# Patient Record
Sex: FEMALE | Race: OTHER | NOT HISPANIC OR LATINO
[De-identification: names, ages, dates, MRNs, and addresses within clinical notes are randomized per-mention and may not be internally consistent; named-entity substitution may affect disease eponyms.]

---

## 2023-06-30 PROBLEM — Z00.00 ENCOUNTER FOR PREVENTIVE HEALTH EXAMINATION: Status: ACTIVE | Noted: 2023-06-30

## 2023-07-13 ENCOUNTER — APPOINTMENT (OUTPATIENT)
Dept: ANTEPARTUM | Facility: CLINIC | Age: 28
End: 2023-07-13
Payer: MEDICAID

## 2023-07-13 ENCOUNTER — ASOB RESULT (OUTPATIENT)
Age: 28
End: 2023-07-13

## 2023-07-13 PROCEDURE — 76801 OB US < 14 WKS SINGLE FETUS: CPT

## 2023-07-13 PROCEDURE — 76813 OB US NUCHAL MEAS 1 GEST: CPT | Mod: 59

## 2023-08-01 ENCOUNTER — EMERGENCY (EMERGENCY)
Facility: HOSPITAL | Age: 28
LOS: 1 days | Discharge: HOME | End: 2023-08-01
Attending: EMERGENCY MEDICINE
Payer: MEDICAID

## 2023-08-01 VITALS
RESPIRATION RATE: 20 BRPM | OXYGEN SATURATION: 99 % | DIASTOLIC BLOOD PRESSURE: 73 MMHG | HEART RATE: 118 BPM | WEIGHT: 117.95 LBS | SYSTOLIC BLOOD PRESSURE: 111 MMHG | TEMPERATURE: 98 F

## 2023-08-01 LAB
ALBUMIN SERPL ELPH-MCNC: 3.3 G/DL — LOW (ref 3.5–5)
ALP SERPL-CCNC: 40 U/L — SIGNIFICANT CHANGE UP (ref 40–120)
ALT FLD-CCNC: 15 U/L DA — SIGNIFICANT CHANGE UP (ref 10–60)
ANION GAP SERPL CALC-SCNC: 7 MMOL/L — SIGNIFICANT CHANGE UP (ref 5–17)
APPEARANCE UR: CLEAR — SIGNIFICANT CHANGE UP
AST SERPL-CCNC: 12 U/L — SIGNIFICANT CHANGE UP (ref 10–40)
BASOPHILS # BLD AUTO: 0.03 K/UL — SIGNIFICANT CHANGE UP (ref 0–0.2)
BASOPHILS NFR BLD AUTO: 0.4 % — SIGNIFICANT CHANGE UP (ref 0–2)
BILIRUB SERPL-MCNC: 0.3 MG/DL — SIGNIFICANT CHANGE UP (ref 0.2–1.2)
BILIRUB UR-MCNC: NEGATIVE — SIGNIFICANT CHANGE UP
BUN SERPL-MCNC: 4 MG/DL — LOW (ref 7–18)
CALCIUM SERPL-MCNC: 9 MG/DL — SIGNIFICANT CHANGE UP (ref 8.4–10.5)
CHLORIDE SERPL-SCNC: 106 MMOL/L — SIGNIFICANT CHANGE UP (ref 96–108)
CO2 SERPL-SCNC: 23 MMOL/L — SIGNIFICANT CHANGE UP (ref 22–31)
COLOR SPEC: YELLOW — SIGNIFICANT CHANGE UP
CREAT SERPL-MCNC: 0.55 MG/DL — SIGNIFICANT CHANGE UP (ref 0.5–1.3)
DIFF PNL FLD: NEGATIVE — SIGNIFICANT CHANGE UP
EGFR: 129 ML/MIN/1.73M2 — SIGNIFICANT CHANGE UP
EOSINOPHIL # BLD AUTO: 0.1 K/UL — SIGNIFICANT CHANGE UP (ref 0–0.5)
EOSINOPHIL NFR BLD AUTO: 1.3 % — SIGNIFICANT CHANGE UP (ref 0–6)
GLUCOSE SERPL-MCNC: 85 MG/DL — SIGNIFICANT CHANGE UP (ref 70–99)
GLUCOSE UR QL: NEGATIVE — SIGNIFICANT CHANGE UP
HCG SERPL-ACNC: HIGH MIU/ML
HCT VFR BLD CALC: 36.5 % — SIGNIFICANT CHANGE UP (ref 34.5–45)
HGB BLD-MCNC: 12.2 G/DL — SIGNIFICANT CHANGE UP (ref 11.5–15.5)
IMM GRANULOCYTES NFR BLD AUTO: 0.7 % — SIGNIFICANT CHANGE UP (ref 0–0.9)
KETONES UR-MCNC: ABNORMAL
LEUKOCYTE ESTERASE UR-ACNC: NEGATIVE — SIGNIFICANT CHANGE UP
LYMPHOCYTES # BLD AUTO: 0.88 K/UL — LOW (ref 1–3.3)
LYMPHOCYTES # BLD AUTO: 11.7 % — LOW (ref 13–44)
MCHC RBC-ENTMCNC: 29 PG — SIGNIFICANT CHANGE UP (ref 27–34)
MCHC RBC-ENTMCNC: 33.4 GM/DL — SIGNIFICANT CHANGE UP (ref 32–36)
MCV RBC AUTO: 86.9 FL — SIGNIFICANT CHANGE UP (ref 80–100)
MONOCYTES # BLD AUTO: 0.6 K/UL — SIGNIFICANT CHANGE UP (ref 0–0.9)
MONOCYTES NFR BLD AUTO: 8 % — SIGNIFICANT CHANGE UP (ref 2–14)
NEUTROPHILS # BLD AUTO: 5.87 K/UL — SIGNIFICANT CHANGE UP (ref 1.8–7.4)
NEUTROPHILS NFR BLD AUTO: 77.9 % — HIGH (ref 43–77)
NITRITE UR-MCNC: NEGATIVE — SIGNIFICANT CHANGE UP
NRBC # BLD: 0 /100 WBCS — SIGNIFICANT CHANGE UP (ref 0–0)
PH UR: 7 — SIGNIFICANT CHANGE UP (ref 5–8)
PLATELET # BLD AUTO: 171 K/UL — SIGNIFICANT CHANGE UP (ref 150–400)
POTASSIUM SERPL-MCNC: 4.3 MMOL/L — SIGNIFICANT CHANGE UP (ref 3.5–5.3)
POTASSIUM SERPL-SCNC: 4.3 MMOL/L — SIGNIFICANT CHANGE UP (ref 3.5–5.3)
PROT SERPL-MCNC: 7.6 G/DL — SIGNIFICANT CHANGE UP (ref 6–8.3)
PROT UR-MCNC: NEGATIVE — SIGNIFICANT CHANGE UP
RBC # BLD: 4.2 M/UL — SIGNIFICANT CHANGE UP (ref 3.8–5.2)
RBC # FLD: 13.8 % — SIGNIFICANT CHANGE UP (ref 10.3–14.5)
SODIUM SERPL-SCNC: 136 MMOL/L — SIGNIFICANT CHANGE UP (ref 135–145)
SP GR SPEC: 1 — LOW (ref 1.01–1.02)
UROBILINOGEN FLD QL: NEGATIVE — SIGNIFICANT CHANGE UP
WBC # BLD: 7.53 K/UL — SIGNIFICANT CHANGE UP (ref 3.8–10.5)
WBC # FLD AUTO: 7.53 K/UL — SIGNIFICANT CHANGE UP (ref 3.8–10.5)

## 2023-08-01 PROCEDURE — 96374 THER/PROPH/DIAG INJ IV PUSH: CPT

## 2023-08-01 PROCEDURE — 85025 COMPLETE CBC W/AUTO DIFF WBC: CPT

## 2023-08-01 PROCEDURE — 99285 EMERGENCY DEPT VISIT HI MDM: CPT | Mod: 25

## 2023-08-01 PROCEDURE — 84702 CHORIONIC GONADOTROPIN TEST: CPT

## 2023-08-01 PROCEDURE — 87086 URINE CULTURE/COLONY COUNT: CPT

## 2023-08-01 PROCEDURE — 36415 COLL VENOUS BLD VENIPUNCTURE: CPT

## 2023-08-01 PROCEDURE — 76805 OB US >/= 14 WKS SNGL FETUS: CPT | Mod: 26

## 2023-08-01 PROCEDURE — 93005 ELECTROCARDIOGRAM TRACING: CPT

## 2023-08-01 PROCEDURE — 76805 OB US >/= 14 WKS SNGL FETUS: CPT

## 2023-08-01 PROCEDURE — 81003 URINALYSIS AUTO W/O SCOPE: CPT

## 2023-08-01 PROCEDURE — 80053 COMPREHEN METABOLIC PANEL: CPT

## 2023-08-01 PROCEDURE — 99284 EMERGENCY DEPT VISIT MOD MDM: CPT

## 2023-08-01 RX ORDER — SODIUM CHLORIDE 9 MG/ML
1000 INJECTION INTRAMUSCULAR; INTRAVENOUS; SUBCUTANEOUS ONCE
Refills: 0 | Status: COMPLETED | OUTPATIENT
Start: 2023-08-01 | End: 2023-08-01

## 2023-08-01 RX ORDER — ACETAMINOPHEN 500 MG
1000 TABLET ORAL ONCE
Refills: 0 | Status: COMPLETED | OUTPATIENT
Start: 2023-08-01 | End: 2023-08-01

## 2023-08-01 RX ADMIN — SODIUM CHLORIDE 1000 MILLILITER(S): 9 INJECTION INTRAMUSCULAR; INTRAVENOUS; SUBCUTANEOUS at 18:52

## 2023-08-01 RX ADMIN — Medication 400 MILLIGRAM(S): at 18:52

## 2023-08-01 NOTE — ED ADULT TRIAGE NOTE - LANGUAGE ASSISTANCE NEEDED
Size Of Lesion In Cm (Optional): 0 Detail Level: Simple Introduction Text (Please End With A Colon): The following procedure was deferred: Yes-Patient/Caregiver accepts free interpretation services...

## 2023-08-01 NOTE — ED PROVIDER NOTE - PATIENT PORTAL LINK FT
You can access the FollowMyHealth Patient Portal offered by [facility] by registering at the following website: [email or web address]. By joining [facility]’s FollowMyHealth portal, you will also be able to view your health information using other applications (apps) compatible with our system.

## 2023-08-01 NOTE — ED ADULT NURSE NOTE - NSFALLUNIVINTERV_ED_ALL_ED
Bed/Stretcher in lowest position, wheels locked, appropriate side rails in place/Call bell, personal items and telephone in reach/Instruct patient to call for assistance before getting out of bed/chair/stretcher/Non-slip footwear applied when patient is off stretcher/[place] to call system/Physically safe environment - no spills, clutter or unnecessary equipment/Purposeful proactive rounding/Room/bathroom lighting operational, light cord in reach

## 2023-08-01 NOTE — ED ADULT NURSE NOTE - OBJECTIVE STATEMENT
16 weeks pregnant with low back pain radiating to the front of abdomen denies vaginal bleeding no difficulty of breathing denies nausea no vomitng

## 2023-08-01 NOTE — ED PROVIDER NOTE - OBJECTIVE STATEMENT
27 year old female denies PMH currently 16 weeks pregnant coming in with lower abd pain and back pain. denies all other complaints.

## 2023-08-02 LAB
CULTURE RESULTS: SIGNIFICANT CHANGE UP
SPECIMEN SOURCE: SIGNIFICANT CHANGE UP

## 2023-08-03 ENCOUNTER — NON-APPOINTMENT (OUTPATIENT)
Age: 28
End: 2023-08-03

## 2023-08-03 ENCOUNTER — APPOINTMENT (OUTPATIENT)
Dept: OBGYN | Facility: CLINIC | Age: 28
End: 2023-08-03
Payer: MEDICAID

## 2023-08-03 ENCOUNTER — OUTPATIENT (OUTPATIENT)
Dept: OUTPATIENT SERVICES | Facility: HOSPITAL | Age: 28
LOS: 1 days | End: 2023-08-03
Payer: MEDICAID

## 2023-08-03 VITALS
OXYGEN SATURATION: 97 % | DIASTOLIC BLOOD PRESSURE: 69 MMHG | RESPIRATION RATE: 18 BRPM | WEIGHT: 118 LBS | BODY MASS INDEX: 21.71 KG/M2 | SYSTOLIC BLOOD PRESSURE: 113 MMHG | TEMPERATURE: 98.3 F | HEART RATE: 108 BPM | HEIGHT: 62 IN

## 2023-08-03 DIAGNOSIS — Z3A.16 16 WEEKS GESTATION OF PREGNANCY: ICD-10-CM

## 2023-08-03 DIAGNOSIS — Z34.00 ENCOUNTER FOR SUPERVISION OF NORMAL FIRST PREGNANCY, UNSPECIFIED TRIMESTER: ICD-10-CM

## 2023-08-03 PROCEDURE — 81420 FETAL CHRMOML ANEUPLOIDY: CPT

## 2023-08-03 PROCEDURE — 99203 OFFICE O/P NEW LOW 30 MIN: CPT

## 2023-08-03 PROCEDURE — 87086 URINE CULTURE/COLONY COUNT: CPT

## 2023-08-03 PROCEDURE — 87591 N.GONORRHOEAE DNA AMP PROB: CPT

## 2023-08-03 PROCEDURE — 81025 URINE PREGNANCY TEST: CPT

## 2023-08-03 PROCEDURE — 36415 COLL VENOUS BLD VENIPUNCTURE: CPT

## 2023-08-03 PROCEDURE — 82105 ALPHA-FETOPROTEIN SERUM: CPT

## 2023-08-03 PROCEDURE — 81003 URINALYSIS AUTO W/O SCOPE: CPT

## 2023-08-03 PROCEDURE — 87491 CHLMYD TRACH DNA AMP PROBE: CPT

## 2023-08-03 PROCEDURE — G0463: CPT

## 2023-08-08 DIAGNOSIS — Z3A.16 16 WEEKS GESTATION OF PREGNANCY: ICD-10-CM

## 2023-08-08 DIAGNOSIS — Z34.92 ENCOUNTER FOR SUPERVISION OF NORMAL PREGNANCY, UNSPECIFIED, SECOND TRIMESTER: ICD-10-CM

## 2023-08-09 PROBLEM — Z00.00 ENCOUNTER FOR PREVENTIVE HEALTH EXAMINATION: Status: ACTIVE | Noted: 2023-08-09

## 2023-08-10 LAB
BACTERIA UR CULT: NORMAL
C TRACH RRNA SPEC QL NAA+PROBE: NOT DETECTED
CHROMOSOME13 INTERPRETATION: NORMAL
CHROMOSOME13 TEST RESULT: NORMAL
CHROMOSOME18 INTERPRETATION: NORMAL
CHROMOSOME18 TEST RESULT: NORMAL
CHROMOSOME21 INTERPRETATION: NORMAL
CHROMOSOME21 TEST RESULT: NORMAL
CYTOLOGY CVX/VAG DOC THIN PREP: NORMAL
FETAL FRACTION: NORMAL
N GONORRHOEA RRNA SPEC QL NAA+PROBE: NOT DETECTED
PERFORMANCE AND LIMITATIONS: NORMAL
SEX CHROMOSOME INTERPRETATION: NORMAL
SEX CHROMOSOME TEST RESULT: NORMAL
SOURCE TP AMPLIFICATION: NORMAL
VERIFI PRENATAL TEST: NOT DETECTED

## 2023-08-14 ENCOUNTER — APPOINTMENT (OUTPATIENT)
Dept: OBGYN | Facility: CLINIC | Age: 28
End: 2023-08-14

## 2023-08-14 ENCOUNTER — NON-APPOINTMENT (OUTPATIENT)
Age: 28
End: 2023-08-14

## 2023-08-18 ENCOUNTER — APPOINTMENT (OUTPATIENT)
Dept: OBGYN | Facility: CLINIC | Age: 28
End: 2023-08-18
Payer: MEDICAID

## 2023-08-18 ENCOUNTER — OUTPATIENT (OUTPATIENT)
Dept: OUTPATIENT SERVICES | Facility: HOSPITAL | Age: 28
LOS: 1 days | End: 2023-08-18
Payer: MEDICAID

## 2023-08-18 ENCOUNTER — APPOINTMENT (OUTPATIENT)
Dept: ANTEPARTUM | Facility: CLINIC | Age: 28
End: 2023-08-18
Payer: MEDICAID

## 2023-08-18 ENCOUNTER — ASOB RESULT (OUTPATIENT)
Age: 28
End: 2023-08-18

## 2023-08-18 VITALS
RESPIRATION RATE: 18 BRPM | DIASTOLIC BLOOD PRESSURE: 72 MMHG | HEART RATE: 82 BPM | SYSTOLIC BLOOD PRESSURE: 106 MMHG | OXYGEN SATURATION: 97 % | TEMPERATURE: 98.8 F | HEIGHT: 62 IN

## 2023-08-18 DIAGNOSIS — Z34.00 ENCOUNTER FOR SUPERVISION OF NORMAL FIRST PREGNANCY, UNSPECIFIED TRIMESTER: ICD-10-CM

## 2023-08-18 LAB
AFP MOM: 1.33
AFP VALUE: 54.1 NG/ML
ALPHA FETOPROTEIN SERUM COMMENT: NORMAL
ALPHA FETOPROTEIN SERUM INTERPRETATION: NORMAL
ALPHA FETOPROTEIN SERUM RESULTS: NORMAL
ALPHA FETOPROTEIN SERUM TEST RESULTS: NORMAL
GESTATIONAL AGE BASED ON: NORMAL
GESTATIONAL AGE ON COLLECTION DATE: 16 WEEKS
INSULIN DEP DIABETES: NO
MATERNAL AGE AT EDD AFP: 28.4 YR
MULTIPLE GESTATION: NO
OSBR RISK 1 IN: 4399
RACE: NORMAL
WEIGHT AFP: 118 LBS

## 2023-08-18 PROCEDURE — 99213 OFFICE O/P EST LOW 20 MIN: CPT

## 2023-08-18 PROCEDURE — 76811 OB US DETAILED SNGL FETUS: CPT

## 2023-08-18 PROCEDURE — 81003 URINALYSIS AUTO W/O SCOPE: CPT

## 2023-08-18 PROCEDURE — G0463: CPT

## 2023-08-21 DIAGNOSIS — Z34.92 ENCOUNTER FOR SUPERVISION OF NORMAL PREGNANCY, UNSPECIFIED, SECOND TRIMESTER: ICD-10-CM

## 2023-08-21 DIAGNOSIS — Z3A.16 16 WEEKS GESTATION OF PREGNANCY: ICD-10-CM

## 2023-08-22 ENCOUNTER — NON-APPOINTMENT (OUTPATIENT)
Age: 28
End: 2023-08-22

## 2023-08-31 ENCOUNTER — APPOINTMENT (OUTPATIENT)
Dept: ANTEPARTUM | Facility: CLINIC | Age: 28
End: 2023-08-31

## 2023-09-15 ENCOUNTER — APPOINTMENT (OUTPATIENT)
Dept: OBGYN | Facility: CLINIC | Age: 28
End: 2023-09-15
Payer: MEDICAID

## 2023-09-15 ENCOUNTER — NON-APPOINTMENT (OUTPATIENT)
Age: 28
End: 2023-09-15

## 2023-09-15 ENCOUNTER — OUTPATIENT (OUTPATIENT)
Dept: OUTPATIENT SERVICES | Facility: HOSPITAL | Age: 28
LOS: 1 days | End: 2023-09-15
Payer: MEDICAID

## 2023-09-15 ENCOUNTER — ASOB RESULT (OUTPATIENT)
Age: 28
End: 2023-09-15

## 2023-09-15 ENCOUNTER — APPOINTMENT (OUTPATIENT)
Dept: ANTEPARTUM | Facility: CLINIC | Age: 28
End: 2023-09-15
Payer: MEDICAID

## 2023-09-15 VITALS
HEART RATE: 105 BPM | DIASTOLIC BLOOD PRESSURE: 65 MMHG | OXYGEN SATURATION: 99 % | SYSTOLIC BLOOD PRESSURE: 102 MMHG | TEMPERATURE: 97.9 F | HEIGHT: 62 IN | WEIGHT: 128 LBS | BODY MASS INDEX: 23.55 KG/M2

## 2023-09-15 DIAGNOSIS — Z3A.16 16 WEEKS GESTATION OF PREGNANCY: ICD-10-CM

## 2023-09-15 DIAGNOSIS — Z34.00 ENCOUNTER FOR SUPERVISION OF NORMAL FIRST PREGNANCY, UNSPECIFIED TRIMESTER: ICD-10-CM

## 2023-09-15 PROCEDURE — 76816 OB US FOLLOW-UP PER FETUS: CPT

## 2023-09-15 PROCEDURE — G0463: CPT

## 2023-09-15 PROCEDURE — 99213 OFFICE O/P EST LOW 20 MIN: CPT

## 2023-09-15 PROCEDURE — 81003 URINALYSIS AUTO W/O SCOPE: CPT

## 2023-09-18 DIAGNOSIS — O34.219 MATERNAL CARE FOR UNSPECIFIED TYPE SCAR FROM PREVIOUS CESAREAN DELIVERY: ICD-10-CM

## 2023-09-18 DIAGNOSIS — Z3A.22 22 WEEKS GESTATION OF PREGNANCY: ICD-10-CM

## 2023-09-18 DIAGNOSIS — Z78.9 OTHER SPECIFIED HEALTH STATUS: ICD-10-CM

## 2023-10-27 ENCOUNTER — ASOB RESULT (OUTPATIENT)
Age: 28
End: 2023-10-27

## 2023-10-27 ENCOUNTER — APPOINTMENT (OUTPATIENT)
Dept: ANTEPARTUM | Facility: CLINIC | Age: 28
End: 2023-10-27
Payer: MEDICAID

## 2023-10-27 ENCOUNTER — APPOINTMENT (OUTPATIENT)
Dept: OBGYN | Facility: CLINIC | Age: 28
End: 2023-10-27
Payer: MEDICAID

## 2023-10-27 ENCOUNTER — OUTPATIENT (OUTPATIENT)
Dept: OUTPATIENT SERVICES | Facility: HOSPITAL | Age: 28
LOS: 1 days | End: 2023-10-27
Payer: MEDICAID

## 2023-10-27 VITALS
BODY MASS INDEX: 24.29 KG/M2 | SYSTOLIC BLOOD PRESSURE: 93 MMHG | DIASTOLIC BLOOD PRESSURE: 60 MMHG | HEIGHT: 62 IN | TEMPERATURE: 97.2 F | WEIGHT: 132 LBS | HEART RATE: 99 BPM | OXYGEN SATURATION: 100 % | RESPIRATION RATE: 18 BRPM

## 2023-10-27 DIAGNOSIS — Z34.00 ENCOUNTER FOR SUPERVISION OF NORMAL FIRST PREGNANCY, UNSPECIFIED TRIMESTER: ICD-10-CM

## 2023-10-27 PROCEDURE — G0463: CPT

## 2023-10-27 PROCEDURE — 99213 OFFICE O/P EST LOW 20 MIN: CPT

## 2023-10-27 PROCEDURE — 86780 TREPONEMA PALLIDUM: CPT

## 2023-10-27 PROCEDURE — 82950 GLUCOSE TEST: CPT

## 2023-10-27 PROCEDURE — 76816 OB US FOLLOW-UP PER FETUS: CPT

## 2023-10-27 PROCEDURE — 81003 URINALYSIS AUTO W/O SCOPE: CPT

## 2023-10-27 PROCEDURE — 85025 COMPLETE CBC W/AUTO DIFF WBC: CPT

## 2023-10-30 ENCOUNTER — NON-APPOINTMENT (OUTPATIENT)
Age: 28
End: 2023-10-30

## 2023-10-30 DIAGNOSIS — Z34.90 ENCOUNTER FOR SUPERVISION OF NORMAL PREGNANCY, UNSPECIFIED, UNSPECIFIED TRIMESTER: ICD-10-CM

## 2023-10-30 DIAGNOSIS — Z34.93 ENCOUNTER FOR SUPERVISION OF NORMAL PREGNANCY, UNSPECIFIED, THIRD TRIMESTER: ICD-10-CM

## 2023-10-30 LAB
BASOPHILS # BLD AUTO: 0.03 K/UL
BASOPHILS NFR BLD AUTO: 0.3 %
EOSINOPHIL # BLD AUTO: 0.27 K/UL
EOSINOPHIL NFR BLD AUTO: 2.4 %
GLUCOSE 1H P 50 G GLC PO SERPL-MCNC: 87 MG/DL
HCT VFR BLD CALC: 32.3 %
HGB BLD-MCNC: 10.1 G/DL
IMM GRANULOCYTES NFR BLD AUTO: 2.2 %
LYMPHOCYTES # BLD AUTO: 1.5 K/UL
LYMPHOCYTES NFR BLD AUTO: 13.2 %
MAN DIFF?: NORMAL
MCHC RBC-ENTMCNC: 27.2 PG
MCHC RBC-ENTMCNC: 31.3 GM/DL
MCV RBC AUTO: 86.8 FL
MONOCYTES # BLD AUTO: 0.59 K/UL
MONOCYTES NFR BLD AUTO: 5.2 %
NEUTROPHILS # BLD AUTO: 8.7 K/UL
NEUTROPHILS NFR BLD AUTO: 76.7 %
PLATELET # BLD AUTO: 177 K/UL
RBC # BLD: 3.72 M/UL
RBC # FLD: 13.5 %
T PALLIDUM AB SER QL IA: NEGATIVE
WBC # FLD AUTO: 11.34 K/UL

## 2023-10-30 RX ORDER — CHLORHEXIDINE GLUCONATE 4 %
325 (65 FE) LIQUID (ML) TOPICAL
Qty: 90 | Refills: 2 | Status: ACTIVE | COMMUNITY
Start: 2023-10-30 | End: 1900-01-01

## 2023-11-10 ENCOUNTER — APPOINTMENT (OUTPATIENT)
Dept: OBGYN | Facility: CLINIC | Age: 28
End: 2023-11-10
Payer: MEDICAID

## 2023-11-10 ENCOUNTER — OUTPATIENT (OUTPATIENT)
Dept: OUTPATIENT SERVICES | Facility: HOSPITAL | Age: 28
LOS: 1 days | End: 2023-11-10
Payer: MEDICAID

## 2023-11-10 VITALS
HEART RATE: 108 BPM | OXYGEN SATURATION: 100 % | WEIGHT: 136 LBS | BODY MASS INDEX: 25.03 KG/M2 | TEMPERATURE: 98.9 F | HEIGHT: 62 IN | SYSTOLIC BLOOD PRESSURE: 97 MMHG | DIASTOLIC BLOOD PRESSURE: 66 MMHG | RESPIRATION RATE: 18 BRPM

## 2023-11-10 DIAGNOSIS — Z34.00 ENCOUNTER FOR SUPERVISION OF NORMAL FIRST PREGNANCY, UNSPECIFIED TRIMESTER: ICD-10-CM

## 2023-11-10 PROCEDURE — 99213 OFFICE O/P EST LOW 20 MIN: CPT

## 2023-11-10 PROCEDURE — G0463: CPT

## 2023-11-10 PROCEDURE — 81003 URINALYSIS AUTO W/O SCOPE: CPT

## 2023-11-13 DIAGNOSIS — Z78.9 OTHER SPECIFIED HEALTH STATUS: ICD-10-CM

## 2023-11-13 DIAGNOSIS — O34.219 MATERNAL CARE FOR UNSPECIFIED TYPE SCAR FROM PREVIOUS CESAREAN DELIVERY: ICD-10-CM

## 2023-11-13 DIAGNOSIS — Z34.90 ENCOUNTER FOR SUPERVISION OF NORMAL PREGNANCY, UNSPECIFIED, UNSPECIFIED TRIMESTER: ICD-10-CM

## 2023-11-27 ENCOUNTER — APPOINTMENT (OUTPATIENT)
Dept: OBGYN | Facility: CLINIC | Age: 28
End: 2023-11-27
Payer: MEDICAID

## 2023-11-27 ENCOUNTER — OUTPATIENT (OUTPATIENT)
Dept: OUTPATIENT SERVICES | Facility: HOSPITAL | Age: 28
LOS: 1 days | End: 2023-11-27
Payer: MEDICAID

## 2023-11-27 VITALS
WEIGHT: 136 LBS | RESPIRATION RATE: 18 BRPM | TEMPERATURE: 97.2 F | HEART RATE: 108 BPM | DIASTOLIC BLOOD PRESSURE: 64 MMHG | OXYGEN SATURATION: 98 % | BODY MASS INDEX: 25.03 KG/M2 | HEIGHT: 62 IN | SYSTOLIC BLOOD PRESSURE: 99 MMHG

## 2023-11-27 DIAGNOSIS — Z34.00 ENCOUNTER FOR SUPERVISION OF NORMAL FIRST PREGNANCY, UNSPECIFIED TRIMESTER: ICD-10-CM

## 2023-11-27 PROCEDURE — 36415 COLL VENOUS BLD VENIPUNCTURE: CPT | Mod: NC

## 2023-11-27 PROCEDURE — 36415 COLL VENOUS BLD VENIPUNCTURE: CPT

## 2023-11-27 PROCEDURE — 81003 URINALYSIS AUTO W/O SCOPE: CPT

## 2023-11-27 PROCEDURE — 86901 BLOOD TYPING SEROLOGIC RH(D): CPT

## 2023-11-27 PROCEDURE — 86480 TB TEST CELL IMMUN MEASURE: CPT

## 2023-11-27 PROCEDURE — G0463: CPT

## 2023-11-27 PROCEDURE — 80053 COMPREHEN METABOLIC PANEL: CPT

## 2023-11-27 PROCEDURE — 83655 ASSAY OF LEAD: CPT

## 2023-11-27 PROCEDURE — 86765 RUBEOLA ANTIBODY: CPT

## 2023-11-27 PROCEDURE — 86900 BLOOD TYPING SEROLOGIC ABO: CPT

## 2023-11-27 PROCEDURE — 86803 HEPATITIS C AB TEST: CPT

## 2023-11-27 PROCEDURE — 84443 ASSAY THYROID STIM HORMONE: CPT

## 2023-11-27 PROCEDURE — 85025 COMPLETE CBC W/AUTO DIFF WBC: CPT

## 2023-11-27 PROCEDURE — 86762 RUBELLA ANTIBODY: CPT

## 2023-11-27 PROCEDURE — 99213 OFFICE O/P EST LOW 20 MIN: CPT

## 2023-11-30 DIAGNOSIS — Z78.9 OTHER SPECIFIED HEALTH STATUS: ICD-10-CM

## 2023-11-30 DIAGNOSIS — Z3A.32 32 WEEKS GESTATION OF PREGNANCY: ICD-10-CM

## 2023-11-30 DIAGNOSIS — O34.219 MATERNAL CARE FOR UNSPECIFIED TYPE SCAR FROM PREVIOUS CESAREAN DELIVERY: ICD-10-CM

## 2023-12-01 ENCOUNTER — NON-APPOINTMENT (OUTPATIENT)
Age: 28
End: 2023-12-01

## 2023-12-01 LAB
ABO + RH PNL BLD: NORMAL
ALBUMIN SERPL ELPH-MCNC: 3.4 G/DL
ALP BLD-CCNC: 90 U/L
ALT SERPL-CCNC: 8 U/L
ANION GAP SERPL CALC-SCNC: 12 MMOL/L
AST SERPL-CCNC: 11 U/L
BASOPHILS # BLD AUTO: 0.04 K/UL
BASOPHILS NFR BLD AUTO: 0.3 %
BILIRUB SERPL-MCNC: <0.2 MG/DL
BUN SERPL-MCNC: 7 MG/DL
CALCIUM SERPL-MCNC: 9 MG/DL
CHLORIDE SERPL-SCNC: 105 MMOL/L
CO2 SERPL-SCNC: 20 MMOL/L
CREAT SERPL-MCNC: 0.53 MG/DL
EGFR: 129 ML/MIN/1.73M2
EOSINOPHIL # BLD AUTO: 0.32 K/UL
EOSINOPHIL NFR BLD AUTO: 2.8 %
GLUCOSE SERPL-MCNC: 87 MG/DL
HCT VFR BLD CALC: 31.7 %
HCV AB SER QL: NONREACTIVE
HCV S/CO RATIO: 0.08 S/CO
HGB BLD-MCNC: 9.8 G/DL
IMM GRANULOCYTES NFR BLD AUTO: 2.5 %
LEAD BLD-MCNC: 1.2 UG/DL
LYMPHOCYTES # BLD AUTO: 1.51 K/UL
LYMPHOCYTES NFR BLD AUTO: 13.1 %
M TB IFN-G BLD-IMP: NEGATIVE
MAN DIFF?: NORMAL
MCHC RBC-ENTMCNC: 26.1 PG
MCHC RBC-ENTMCNC: 30.9 GM/DL
MCV RBC AUTO: 84.5 FL
MEV IGG FLD QL IA: 22.1 AU/ML
MEV IGG+IGM SER-IMP: POSITIVE
MONOCYTES # BLD AUTO: 0.54 K/UL
MONOCYTES NFR BLD AUTO: 4.7 %
NEUTROPHILS # BLD AUTO: 8.83 K/UL
NEUTROPHILS NFR BLD AUTO: 76.6 %
PLATELET # BLD AUTO: 190 K/UL
POTASSIUM SERPL-SCNC: 3.8 MMOL/L
PROT SERPL-MCNC: 6 G/DL
QUANTIFERON TB PLUS MITOGEN MINUS NIL: 0.74 IU/ML
QUANTIFERON TB PLUS NIL: 0.03 IU/ML
QUANTIFERON TB PLUS TB1 MINUS NIL: 0.03 IU/ML
QUANTIFERON TB PLUS TB2 MINUS NIL: 0.02 IU/ML
RBC # BLD: 3.75 M/UL
RBC # FLD: 15.7 %
RUBV IGG FLD-ACNC: 11.9 INDEX
RUBV IGG SER-IMP: POSITIVE
SODIUM SERPL-SCNC: 138 MMOL/L
TSH SERPL-ACNC: 0.74 UIU/ML
WBC # FLD AUTO: 11.53 K/UL

## 2023-12-03 ENCOUNTER — NON-APPOINTMENT (OUTPATIENT)
Age: 28
End: 2023-12-03

## 2023-12-07 ENCOUNTER — NON-APPOINTMENT (OUTPATIENT)
Age: 28
End: 2023-12-07

## 2023-12-08 ENCOUNTER — APPOINTMENT (OUTPATIENT)
Dept: ANTEPARTUM | Facility: CLINIC | Age: 28
End: 2023-12-08
Payer: MEDICAID

## 2023-12-08 ENCOUNTER — APPOINTMENT (OUTPATIENT)
Dept: OBGYN | Facility: CLINIC | Age: 28
End: 2023-12-08
Payer: MEDICAID

## 2023-12-08 ENCOUNTER — ASOB RESULT (OUTPATIENT)
Age: 28
End: 2023-12-08

## 2023-12-08 ENCOUNTER — OUTPATIENT (OUTPATIENT)
Dept: OUTPATIENT SERVICES | Facility: HOSPITAL | Age: 28
LOS: 1 days | End: 2023-12-08
Payer: MEDICAID

## 2023-12-08 VITALS
BODY MASS INDEX: 25.21 KG/M2 | TEMPERATURE: 97 F | SYSTOLIC BLOOD PRESSURE: 99 MMHG | HEIGHT: 62 IN | RESPIRATION RATE: 18 BRPM | WEIGHT: 137 LBS | OXYGEN SATURATION: 98 % | HEART RATE: 105 BPM | DIASTOLIC BLOOD PRESSURE: 62 MMHG

## 2023-12-08 DIAGNOSIS — Z34.00 ENCOUNTER FOR SUPERVISION OF NORMAL FIRST PREGNANCY, UNSPECIFIED TRIMESTER: ICD-10-CM

## 2023-12-08 PROCEDURE — 76816 OB US FOLLOW-UP PER FETUS: CPT

## 2023-12-08 PROCEDURE — 81003 URINALYSIS AUTO W/O SCOPE: CPT

## 2023-12-08 PROCEDURE — 99213 OFFICE O/P EST LOW 20 MIN: CPT

## 2023-12-08 PROCEDURE — 36415 COLL VENOUS BLD VENIPUNCTURE: CPT

## 2023-12-08 PROCEDURE — 86900 BLOOD TYPING SEROLOGIC ABO: CPT

## 2023-12-08 PROCEDURE — 76819 FETAL BIOPHYS PROFIL W/O NST: CPT | Mod: 59

## 2023-12-08 PROCEDURE — 86780 TREPONEMA PALLIDUM: CPT

## 2023-12-08 PROCEDURE — 85025 COMPLETE CBC W/AUTO DIFF WBC: CPT

## 2023-12-08 PROCEDURE — 87389 HIV-1 AG W/HIV-1&-2 AB AG IA: CPT

## 2023-12-08 PROCEDURE — 86850 RBC ANTIBODY SCREEN: CPT

## 2023-12-08 PROCEDURE — G0463: CPT

## 2023-12-11 DIAGNOSIS — R06.00 DYSPNEA, UNSPECIFIED: ICD-10-CM

## 2023-12-11 DIAGNOSIS — Z3A.34 34 WEEKS GESTATION OF PREGNANCY: ICD-10-CM

## 2023-12-11 DIAGNOSIS — O34.219 MATERNAL CARE FOR UNSPECIFIED TYPE SCAR FROM PREVIOUS CESAREAN DELIVERY: ICD-10-CM

## 2023-12-11 DIAGNOSIS — Z78.9 OTHER SPECIFIED HEALTH STATUS: ICD-10-CM

## 2023-12-11 LAB
BASOPHILS # BLD AUTO: 0.02 K/UL
BASOPHILS NFR BLD AUTO: 0.2 %
EOSINOPHIL # BLD AUTO: 0.2 K/UL
EOSINOPHIL NFR BLD AUTO: 1.9 %
HCT VFR BLD CALC: 30.2 %
HGB BLD-MCNC: 9.6 G/DL
HIV1+2 AB SPEC QL IA.RAPID: NONREACTIVE
IMM GRANULOCYTES NFR BLD AUTO: 2 %
LYMPHOCYTES # BLD AUTO: 1.29 K/UL
LYMPHOCYTES NFR BLD AUTO: 12.4 %
MAN DIFF?: NORMAL
MCHC RBC-ENTMCNC: 25.9 PG
MCHC RBC-ENTMCNC: 31.8 GM/DL
MCV RBC AUTO: 81.6 FL
MONOCYTES # BLD AUTO: 0.52 K/UL
MONOCYTES NFR BLD AUTO: 5 %
NEUTROPHILS # BLD AUTO: 8.15 K/UL
NEUTROPHILS NFR BLD AUTO: 78.5 %
PLATELET # BLD AUTO: 209 K/UL
RBC # BLD: 3.7 M/UL
RBC # FLD: 15.6 %
T PALLIDUM AB SER QL IA: NEGATIVE
WBC # FLD AUTO: 10.39 K/UL

## 2023-12-21 ENCOUNTER — NON-APPOINTMENT (OUTPATIENT)
Age: 28
End: 2023-12-21

## 2023-12-22 ENCOUNTER — OUTPATIENT (OUTPATIENT)
Dept: OUTPATIENT SERVICES | Facility: HOSPITAL | Age: 28
LOS: 1 days | End: 2023-12-22
Payer: MEDICAID

## 2023-12-22 ENCOUNTER — APPOINTMENT (OUTPATIENT)
Dept: OBGYN | Facility: CLINIC | Age: 28
End: 2023-12-22
Payer: MEDICAID

## 2023-12-22 ENCOUNTER — LABORATORY RESULT (OUTPATIENT)
Age: 28
End: 2023-12-22

## 2023-12-22 VITALS
TEMPERATURE: 97.2 F | BODY MASS INDEX: 25.58 KG/M2 | SYSTOLIC BLOOD PRESSURE: 103 MMHG | HEART RATE: 95 BPM | HEIGHT: 62 IN | WEIGHT: 139 LBS | OXYGEN SATURATION: 99 % | RESPIRATION RATE: 18 BRPM | DIASTOLIC BLOOD PRESSURE: 70 MMHG

## 2023-12-22 DIAGNOSIS — Z34.00 ENCOUNTER FOR SUPERVISION OF NORMAL FIRST PREGNANCY, UNSPECIFIED TRIMESTER: ICD-10-CM

## 2023-12-22 PROCEDURE — 36415 COLL VENOUS BLD VENIPUNCTURE: CPT

## 2023-12-22 PROCEDURE — 86850 RBC ANTIBODY SCREEN: CPT

## 2023-12-22 PROCEDURE — 87653 STREP B DNA AMP PROBE: CPT

## 2023-12-22 PROCEDURE — 99213 OFFICE O/P EST LOW 20 MIN: CPT

## 2023-12-22 PROCEDURE — 81003 URINALYSIS AUTO W/O SCOPE: CPT

## 2023-12-22 PROCEDURE — 87389 HIV-1 AG W/HIV-1&-2 AB AG IA: CPT

## 2023-12-22 PROCEDURE — G0463: CPT

## 2023-12-22 PROCEDURE — 86900 BLOOD TYPING SEROLOGIC ABO: CPT

## 2023-12-22 PROCEDURE — 86901 BLOOD TYPING SEROLOGIC RH(D): CPT

## 2023-12-22 PROCEDURE — 85025 COMPLETE CBC W/AUTO DIFF WBC: CPT

## 2023-12-22 PROCEDURE — 86780 TREPONEMA PALLIDUM: CPT

## 2023-12-26 LAB
ABO + RH PNL BLD: NORMAL
BASOPHILS # BLD AUTO: 0 K/UL
BASOPHILS NFR BLD AUTO: 0 %
BLD GP AB SCN SERPL QL: NORMAL
C TRACH RRNA SPEC QL NAA+PROBE: NOT DETECTED
EOSINOPHIL # BLD AUTO: 0.49 K/UL
EOSINOPHIL NFR BLD AUTO: 5.1 %
GP B STREP DNA SPEC QL NAA+PROBE: NOT DETECTED
HCT VFR BLD CALC: 34.2 %
HGB BLD-MCNC: 10.6 G/DL
HIV1+2 AB SPEC QL IA.RAPID: NONREACTIVE
LYMPHOCYTES # BLD AUTO: 1.22 K/UL
LYMPHOCYTES NFR BLD AUTO: 12.7 %
MAN DIFF?: NORMAL
MCHC RBC-ENTMCNC: 26.2 PG
MCHC RBC-ENTMCNC: 31 GM/DL
MCV RBC AUTO: 84.7 FL
MONOCYTES # BLD AUTO: 0 K/UL
MONOCYTES NFR BLD AUTO: 0 %
N GONORRHOEA RRNA SPEC QL NAA+PROBE: NOT DETECTED
NEUTROPHILS # BLD AUTO: 7.81 K/UL
NEUTROPHILS NFR BLD AUTO: 81.4 %
PLATELET # BLD AUTO: 166 K/UL
RBC # BLD: 4.04 M/UL
RBC # FLD: 20.4 %
SOURCE AMPLIFICATION: NORMAL
SOURCE GBS: NORMAL
T PALLIDUM AB SER QL IA: NEGATIVE
WBC # FLD AUTO: 9.6 K/UL

## 2023-12-27 ENCOUNTER — OUTPATIENT (OUTPATIENT)
Dept: OUTPATIENT SERVICES | Facility: HOSPITAL | Age: 28
LOS: 1 days | End: 2023-12-27
Payer: MEDICAID

## 2023-12-27 VITALS
SYSTOLIC BLOOD PRESSURE: 106 MMHG | DIASTOLIC BLOOD PRESSURE: 68 MMHG | OXYGEN SATURATION: 99 % | TEMPERATURE: 98 F | HEART RATE: 107 BPM | RESPIRATION RATE: 19 BRPM

## 2023-12-27 DIAGNOSIS — O26.899 OTHER SPECIFIED PREGNANCY RELATED CONDITIONS, UNSPECIFIED TRIMESTER: ICD-10-CM

## 2023-12-27 DIAGNOSIS — Z90.49 ACQUIRED ABSENCE OF OTHER SPECIFIED PARTS OF DIGESTIVE TRACT: Chronic | ICD-10-CM

## 2023-12-27 DIAGNOSIS — O34.219 MATERNAL CARE FOR UNSPECIFIED TYPE SCAR FROM PREVIOUS CESAREAN DELIVERY: ICD-10-CM

## 2023-12-27 DIAGNOSIS — Z3A.36 36 WEEKS GESTATION OF PREGNANCY: ICD-10-CM

## 2023-12-27 DIAGNOSIS — Z78.9 OTHER SPECIFIED HEALTH STATUS: ICD-10-CM

## 2023-12-27 LAB
APPEARANCE UR: CLEAR — SIGNIFICANT CHANGE UP
APPEARANCE UR: CLEAR — SIGNIFICANT CHANGE UP
BILIRUB UR-MCNC: NEGATIVE — SIGNIFICANT CHANGE UP
BILIRUB UR-MCNC: NEGATIVE — SIGNIFICANT CHANGE UP
COLOR SPEC: YELLOW — SIGNIFICANT CHANGE UP
COLOR SPEC: YELLOW — SIGNIFICANT CHANGE UP
DIFF PNL FLD: NEGATIVE — SIGNIFICANT CHANGE UP
DIFF PNL FLD: NEGATIVE — SIGNIFICANT CHANGE UP
GLUCOSE UR QL: NEGATIVE MG/DL — SIGNIFICANT CHANGE UP
GLUCOSE UR QL: NEGATIVE MG/DL — SIGNIFICANT CHANGE UP
KETONES UR-MCNC: NEGATIVE MG/DL — SIGNIFICANT CHANGE UP
KETONES UR-MCNC: NEGATIVE MG/DL — SIGNIFICANT CHANGE UP
LEUKOCYTE ESTERASE UR-ACNC: NEGATIVE — SIGNIFICANT CHANGE UP
LEUKOCYTE ESTERASE UR-ACNC: NEGATIVE — SIGNIFICANT CHANGE UP
NITRITE UR-MCNC: NEGATIVE — SIGNIFICANT CHANGE UP
NITRITE UR-MCNC: NEGATIVE — SIGNIFICANT CHANGE UP
PH UR: 7 — SIGNIFICANT CHANGE UP (ref 5–8)
PH UR: 7 — SIGNIFICANT CHANGE UP (ref 5–8)
PROT UR-MCNC: NEGATIVE MG/DL — SIGNIFICANT CHANGE UP
PROT UR-MCNC: NEGATIVE MG/DL — SIGNIFICANT CHANGE UP
SP GR SPEC: 1.01 — SIGNIFICANT CHANGE UP (ref 1–1.03)
SP GR SPEC: 1.01 — SIGNIFICANT CHANGE UP (ref 1–1.03)
UROBILINOGEN FLD QL: 1 MG/DL — SIGNIFICANT CHANGE UP (ref 0.2–1)
UROBILINOGEN FLD QL: 1 MG/DL — SIGNIFICANT CHANGE UP (ref 0.2–1)

## 2023-12-27 PROCEDURE — 81003 URINALYSIS AUTO W/O SCOPE: CPT

## 2023-12-27 PROCEDURE — 99213 OFFICE O/P EST LOW 20 MIN: CPT

## 2023-12-27 PROCEDURE — 59025 FETAL NON-STRESS TEST: CPT

## 2023-12-27 PROCEDURE — 99285 EMERGENCY DEPT VISIT HI MDM: CPT

## 2023-12-27 PROCEDURE — G0463: CPT

## 2023-12-27 NOTE — OB PROVIDER TRIAGE NOTE - YOU WERE IN THE HOSPITAL FOR:
patient received IV fluid bolus  feels better, repeat sve closed  nst cat I, no contractions  dc home  patient was reassured  keep all future appts, next one is 12/29  labor precautions given  kick count instructions given  return to labor room with any maternal.fetal concerns

## 2023-12-27 NOTE — OB PROVIDER TRIAGE NOTE - HISTORY OF PRESENT ILLNESS
[id number]    27y/o  37 weeks REGINO  LMP  presented with c/o contractions x3 days. Upon interviewing patient, noted that patient reports pain when the baby is kicking against the monitor. Patient was reassured that this sensation is not a contraction it is the baby kicking. Patient denies VB, LOF. Reports fetal movement.  PNC [facility]   pobx c/s 10/2021 42weeks in [place] "did not dilate" c/b 16 weeks vanishing twin admitted for one month  sab 4-5 weeks complete 2022  pgynx denies uterine fibroid, ovarian cyst, sti, abnormal pap  pmhx denies  psx appy 2013  meds iron  allergies denies  sochx denies  [id number]    27y/o  37 weeks REGINO  LMP  presented with c/o contractions x3 days. Upon interviewing patient, noted that patient reports pain when the baby is kicking against the monitor. Patient was reassured that this sensation is not a contraction it is the baby kicking. Patient denies VB, LOF. Reports fetal movement.  PNC [facility]   pobx c/s 10/2021 42weeks in [place] "did not dilate" c/b 16 weeks vanishing twin admitted for one month  sab 4-5 weeks complete 2022  pgynx denies uterine fibroid, ovarian cyst, sti, abnormal pap  pmhx denies  psx appy 2013  meds iron  allergies denies  sochx denies  [id number]    27y/o  37 weeks REGINO  LMP  presented with c/o contractions x3 days. Upon interviewing patient, noted that patient reports pain when the baby is kicking against the monitor. Patient was reassured that this sensation is not a contraction it is the baby kicking. Patient denies VB, LOF. Reports fetal movement.  PNC [facility]   pobx c/s 10/2021 42weeks in [place] "did not dilate" c/b 16 weeks vanishing twin admitted for one month  sab 4-5 weeks complete 2022  pgynx denies uterine fibroid, ovarian cyst, sti, abnormal pap  pmhx denies  psx appy 2013  meds iron  allergies denies  sochx denies

## 2023-12-27 NOTE — OB RN TRIAGE NOTE - INTERPRETER'S NAME
Reason for Call    Called patient to check in after PD cath placement. He had his first dressing change this week and is scheduled to start training next week.    Reviewed cancelled appointment with Dr. Perez and that Dr. Muñiz will be his nephrologist while he's on dialysis. Will continue to come here to the  with any transplant related issues.      Patient was given an opportunity to ask questions and have those questions answered to his satisfaction.  Patient verbalized understanding of instructions provided and agreed to plan of care.    Amaury Vazquez, RN, BAN  Nephrology RN Care Coordinator           [place]

## 2023-12-27 NOTE — OB PROVIDER TRIAGE NOTE - NSHPPHYSICALEXAM_GEN_ALL_CORE
Vital Signs Last 24 Hrs  T(C): 36.8 (27 Dec 2023 03:03), Max: 36.9 (27 Dec 2023 02:49)  T(F): 98.3 (27 Dec 2023 03:03), Max: 98.4 (27 Dec 2023 02:49)  HR: 107 (27 Dec 2023 03:03) (107 - 113)  BP: 106/68 (27 Dec 2023 03:03) (106/68 - 129/77)  BP(mean): --  RR: 19 (27 Dec 2023 03:03) (19 - 20)  SpO2: 99% (27 Dec 2023 03:03) (99% - 100%)    Parameters below as of 27 Dec 2023 03:03  Patient On (Oxygen Delivery Method): room air    gen aox3, not in acute distress, appears comfortable, grimace when baby kicks  abd gravid, soft, nontender, no guarding, no rebound  sve closed  nst cat I  toco no contractions

## 2023-12-27 NOTE — OB PROVIDER TRIAGE NOTE - ADDITIONAL INSTRUCTIONS
patient received IV fluid bolus  feels better, repeat sve closed  nst cat I, no contractions  dc home  patient was reassured  keep all future appts, next one is 12/29  labor precautions given  kick count instructions given  return to labor room with any maternal.fetal concerns  armand Gomez [place] attending patient received IV fluid bolus  feels better, repeat sve closed  nst cat I, no contractions  dc home  patient was reassured  keep all future appts, next one is 12/29  labor precautions given  kick count instructions given  return to labor room with any maternal.fetal concerns  armand Gomez [place] attending patient received IV fluid bolus  feels better, repeat sve closed  nst cat I, no contractions  dc home  patient was reassured  keep all future appts, next one is 12/29  labor precautions given  kick count instructions given  return to labor room with any maternal.fetal concerns  armand Gomez [place] attending

## 2023-12-27 NOTE — OB PROVIDER TRIAGE NOTE - NS_OBGYNHISTORY_OBGYN_ALL_OB_FT
SAB 12/2020 without D&C, C/Section 10/2021 arrest of dilatation girl, twin pregnancy lost one at four months was in hospital for one month after lost, delivered other twin at 42 weeks gestation.  Appendectomy 01/2013

## 2023-12-27 NOTE — OB RN TRIAGE NOTE - FALL HARM RISK - UNIVERSAL INTERVENTIONS
Bed in lowest position, wheels locked, appropriate side rails in place/Call bell, personal items and telephone in reach/Instruct patient to call for assistance before getting out of bed or chair/Non-slip footwear when patient is out of bed/[place] to call system/Physically safe environment - no spills, clutter or unnecessary equipment/Purposeful Proactive Rounding/Room/bathroom lighting operational, light cord in reach Bed in lowest position, wheels locked, appropriate side rails in place/Call bell, personal items and telephone in reach/Instruct patient to call for assistance before getting out of bed or chair/Non-slip footwear when patient is out of bed/[place] to call system/Physically safe environment - no spills, clutter or unnecessary equipment/Purposeful Proactive Rounding/Room/bathroom lighting operational, light cord in reach

## 2023-12-27 NOTE — OB PROVIDER TRIAGE NOTE - NSPROVTRIAGESELHIDDEN_OBGYN_ALL_OB_FT
[NS_AttendInformed_OBGYN_ALL_OB:[id number]==] [NS_AttendInformed_OBGYN_ALL_OB:[id number]==] [NS_AttendInformed_OBGYN_ALL_OB:[id number]==]

## 2023-12-27 NOTE — OB RN TRIAGE NOTE - NS_OBGYNHISTORY_OBGYN_ALL_OB_FT
SAB 12/2020 without D&C, C/Section 10/2021 arrest of dilitation girl, twin pregnancy lost one at four months was in hospital for one month after lost, delivered other twin at 42 weeks gestation.  Appendectomy 01/2013 SAB 12/2020 without D&C, C/Section 10/2021 arrest of dilatation girl, twin pregnancy lost one at four months was in hospital for one month after lost, delivered other twin at 42 weeks gestation.  Appendectomy 01/2013

## 2023-12-27 NOTE — OB RN TRIAGE NOTE - CHIEF COMPLAINT QUOTE
"I've been having lower abdominal pain for three days but the pain got worse earlier at about 0100."

## 2023-12-27 NOTE — OB PROVIDER TRIAGE NOTE - NSOBPROVIDERNOTE_OBGYN_ALL_OB_FT
a/p 27y/o  37 weeks r/o labor, stable  IV fluid bolus  ua  reassess  dw Dr. Gomez [place] attending  npo  continue nst a/p 29y/o  37 weeks r/o labor, stable  IV fluid bolus  ua  reassess  dw Dr. Gomez [place] attending  npo  continue nst a/p 29y/o  37 weeks r/o labor, stable  IV fluid bolus  ua  reassess  dw Dr. Gomez [place] attending  npo  continue nst

## 2023-12-29 ENCOUNTER — APPOINTMENT (OUTPATIENT)
Dept: OBGYN | Facility: CLINIC | Age: 28
End: 2023-12-29

## 2023-12-29 DIAGNOSIS — O34.219 MATERNAL CARE FOR UNSPECIFIED TYPE SCAR FROM PREVIOUS CESAREAN DELIVERY: ICD-10-CM

## 2023-12-29 DIAGNOSIS — O44.43 LOW LYING PLACENTA NOS OR WITHOUT HEMORRHAGE, THIRD TRIMESTER: ICD-10-CM

## 2023-12-29 DIAGNOSIS — O47.1 FALSE LABOR AT OR AFTER 37 COMPLETED WEEKS OF GESTATION: ICD-10-CM

## 2023-12-29 DIAGNOSIS — Z3A.37 37 WEEKS GESTATION OF PREGNANCY: ICD-10-CM

## 2024-01-05 ENCOUNTER — NON-APPOINTMENT (OUTPATIENT)
Age: 29
End: 2024-01-05

## 2024-01-05 ENCOUNTER — APPOINTMENT (OUTPATIENT)
Dept: OBGYN | Facility: CLINIC | Age: 29
End: 2024-01-05
Payer: MEDICAID

## 2024-01-05 ENCOUNTER — OUTPATIENT (OUTPATIENT)
Dept: OUTPATIENT SERVICES | Facility: HOSPITAL | Age: 29
LOS: 1 days | End: 2024-01-05
Payer: MEDICAID

## 2024-01-05 VITALS
HEART RATE: 98 BPM | TEMPERATURE: 98.2 F | WEIGHT: 141 LBS | BODY MASS INDEX: 25.95 KG/M2 | OXYGEN SATURATION: 99 % | DIASTOLIC BLOOD PRESSURE: 77 MMHG | HEIGHT: 62 IN | SYSTOLIC BLOOD PRESSURE: 115 MMHG

## 2024-01-05 DIAGNOSIS — Z90.49 ACQUIRED ABSENCE OF OTHER SPECIFIED PARTS OF DIGESTIVE TRACT: Chronic | ICD-10-CM

## 2024-01-05 DIAGNOSIS — Z34.00 ENCOUNTER FOR SUPERVISION OF NORMAL FIRST PREGNANCY, UNSPECIFIED TRIMESTER: ICD-10-CM

## 2024-01-05 PROCEDURE — G0463: CPT

## 2024-01-05 PROCEDURE — 81003 URINALYSIS AUTO W/O SCOPE: CPT

## 2024-01-05 PROCEDURE — 99213 OFFICE O/P EST LOW 20 MIN: CPT

## 2024-01-05 RX ORDER — GUAIFENESIN 600 MG/1
600 TABLET, EXTENDED RELEASE ORAL
Qty: 30 | Refills: 0 | Status: ACTIVE | COMMUNITY
Start: 2024-01-05 | End: 1900-01-01

## 2024-01-08 ENCOUNTER — OUTPATIENT (OUTPATIENT)
Dept: OUTPATIENT SERVICES | Facility: HOSPITAL | Age: 29
LOS: 1 days | End: 2024-01-08
Payer: MEDICAID

## 2024-01-08 DIAGNOSIS — B34.9 VIRAL INFECTION, UNSPECIFIED: ICD-10-CM

## 2024-01-08 DIAGNOSIS — Z60.3 ACCULTURATION DIFFICULTY: ICD-10-CM

## 2024-01-08 DIAGNOSIS — Z3A.38 38 WEEKS GESTATION OF PREGNANCY: ICD-10-CM

## 2024-01-08 DIAGNOSIS — O34.219 MATERNAL CARE FOR UNSPECIFIED TYPE SCAR FROM PREVIOUS CESAREAN DELIVERY: ICD-10-CM

## 2024-01-08 DIAGNOSIS — Z90.49 ACQUIRED ABSENCE OF OTHER SPECIFIED PARTS OF DIGESTIVE TRACT: Chronic | ICD-10-CM

## 2024-01-08 DIAGNOSIS — Z01.818 ENCOUNTER FOR OTHER PREPROCEDURAL EXAMINATION: ICD-10-CM

## 2024-01-08 LAB
ALBUMIN SERPL ELPH-MCNC: 2.7 G/DL — LOW (ref 3.5–5)
ALBUMIN SERPL ELPH-MCNC: 2.7 G/DL — LOW (ref 3.5–5)
ALP SERPL-CCNC: 169 U/L — HIGH (ref 40–120)
ALP SERPL-CCNC: 169 U/L — HIGH (ref 40–120)
ALT FLD-CCNC: 17 U/L DA — SIGNIFICANT CHANGE UP (ref 10–60)
ALT FLD-CCNC: 17 U/L DA — SIGNIFICANT CHANGE UP (ref 10–60)
ANION GAP SERPL CALC-SCNC: 6 MMOL/L — SIGNIFICANT CHANGE UP (ref 5–17)
ANION GAP SERPL CALC-SCNC: 6 MMOL/L — SIGNIFICANT CHANGE UP (ref 5–17)
APTT BLD: 28.4 SEC — SIGNIFICANT CHANGE UP (ref 24.5–35.6)
APTT BLD: 28.4 SEC — SIGNIFICANT CHANGE UP (ref 24.5–35.6)
AST SERPL-CCNC: 15 U/L — SIGNIFICANT CHANGE UP (ref 10–40)
AST SERPL-CCNC: 15 U/L — SIGNIFICANT CHANGE UP (ref 10–40)
BILIRUB SERPL-MCNC: 0.3 MG/DL — SIGNIFICANT CHANGE UP (ref 0.2–1.2)
BILIRUB SERPL-MCNC: 0.3 MG/DL — SIGNIFICANT CHANGE UP (ref 0.2–1.2)
BLD GP AB SCN SERPL QL: SIGNIFICANT CHANGE UP
BLD GP AB SCN SERPL QL: SIGNIFICANT CHANGE UP
BUN SERPL-MCNC: 5 MG/DL — LOW (ref 7–18)
BUN SERPL-MCNC: 5 MG/DL — LOW (ref 7–18)
CALCIUM SERPL-MCNC: 9.5 MG/DL — SIGNIFICANT CHANGE UP (ref 8.4–10.5)
CALCIUM SERPL-MCNC: 9.5 MG/DL — SIGNIFICANT CHANGE UP (ref 8.4–10.5)
CHLORIDE SERPL-SCNC: 107 MMOL/L — SIGNIFICANT CHANGE UP (ref 96–108)
CHLORIDE SERPL-SCNC: 107 MMOL/L — SIGNIFICANT CHANGE UP (ref 96–108)
CO2 SERPL-SCNC: 24 MMOL/L — SIGNIFICANT CHANGE UP (ref 22–31)
CO2 SERPL-SCNC: 24 MMOL/L — SIGNIFICANT CHANGE UP (ref 22–31)
CREAT SERPL-MCNC: 0.54 MG/DL — SIGNIFICANT CHANGE UP (ref 0.5–1.3)
CREAT SERPL-MCNC: 0.54 MG/DL — SIGNIFICANT CHANGE UP (ref 0.5–1.3)
EGFR: 129 ML/MIN/1.73M2 — SIGNIFICANT CHANGE UP
EGFR: 129 ML/MIN/1.73M2 — SIGNIFICANT CHANGE UP
GLUCOSE SERPL-MCNC: 76 MG/DL — SIGNIFICANT CHANGE UP (ref 70–99)
GLUCOSE SERPL-MCNC: 76 MG/DL — SIGNIFICANT CHANGE UP (ref 70–99)
HCT VFR BLD CALC: 37.4 % — SIGNIFICANT CHANGE UP (ref 34.5–45)
HCT VFR BLD CALC: 37.4 % — SIGNIFICANT CHANGE UP (ref 34.5–45)
HGB BLD-MCNC: 11.8 G/DL — SIGNIFICANT CHANGE UP (ref 11.5–15.5)
HGB BLD-MCNC: 11.8 G/DL — SIGNIFICANT CHANGE UP (ref 11.5–15.5)
INR BLD: 0.87 RATIO — SIGNIFICANT CHANGE UP (ref 0.85–1.18)
INR BLD: 0.87 RATIO — SIGNIFICANT CHANGE UP (ref 0.85–1.18)
MCHC RBC-ENTMCNC: 27.1 PG — SIGNIFICANT CHANGE UP (ref 27–34)
MCHC RBC-ENTMCNC: 27.1 PG — SIGNIFICANT CHANGE UP (ref 27–34)
MCHC RBC-ENTMCNC: 31.6 GM/DL — LOW (ref 32–36)
MCHC RBC-ENTMCNC: 31.6 GM/DL — LOW (ref 32–36)
MCV RBC AUTO: 86 FL — SIGNIFICANT CHANGE UP (ref 80–100)
MCV RBC AUTO: 86 FL — SIGNIFICANT CHANGE UP (ref 80–100)
NRBC # BLD: 0 /100 WBCS — SIGNIFICANT CHANGE UP (ref 0–0)
NRBC # BLD: 0 /100 WBCS — SIGNIFICANT CHANGE UP (ref 0–0)
PLATELET # BLD AUTO: 187 K/UL — SIGNIFICANT CHANGE UP (ref 150–400)
PLATELET # BLD AUTO: 187 K/UL — SIGNIFICANT CHANGE UP (ref 150–400)
POTASSIUM SERPL-MCNC: 3.8 MMOL/L — SIGNIFICANT CHANGE UP (ref 3.5–5.3)
POTASSIUM SERPL-MCNC: 3.8 MMOL/L — SIGNIFICANT CHANGE UP (ref 3.5–5.3)
POTASSIUM SERPL-SCNC: 3.8 MMOL/L — SIGNIFICANT CHANGE UP (ref 3.5–5.3)
POTASSIUM SERPL-SCNC: 3.8 MMOL/L — SIGNIFICANT CHANGE UP (ref 3.5–5.3)
PROT SERPL-MCNC: 6.7 G/DL — SIGNIFICANT CHANGE UP (ref 6–8.3)
PROT SERPL-MCNC: 6.7 G/DL — SIGNIFICANT CHANGE UP (ref 6–8.3)
PROTHROM AB SERPL-ACNC: 10 SEC — SIGNIFICANT CHANGE UP (ref 9.5–13)
PROTHROM AB SERPL-ACNC: 10 SEC — SIGNIFICANT CHANGE UP (ref 9.5–13)
RBC # BLD: 4.35 M/UL — SIGNIFICANT CHANGE UP (ref 3.8–5.2)
RBC # BLD: 4.35 M/UL — SIGNIFICANT CHANGE UP (ref 3.8–5.2)
RBC # FLD: 20.7 % — HIGH (ref 10.3–14.5)
RBC # FLD: 20.7 % — HIGH (ref 10.3–14.5)
SODIUM SERPL-SCNC: 137 MMOL/L — SIGNIFICANT CHANGE UP (ref 135–145)
SODIUM SERPL-SCNC: 137 MMOL/L — SIGNIFICANT CHANGE UP (ref 135–145)
WBC # BLD: 9.1 K/UL — SIGNIFICANT CHANGE UP (ref 3.8–10.5)
WBC # BLD: 9.1 K/UL — SIGNIFICANT CHANGE UP (ref 3.8–10.5)
WBC # FLD AUTO: 9.1 K/UL — SIGNIFICANT CHANGE UP (ref 3.8–10.5)
WBC # FLD AUTO: 9.1 K/UL — SIGNIFICANT CHANGE UP (ref 3.8–10.5)

## 2024-01-08 SDOH — SOCIAL STABILITY - SOCIAL INSECURITY: ACCULTURATION DIFFICULTY: Z60.3

## 2024-01-09 ENCOUNTER — TRANSCRIPTION ENCOUNTER (OUTPATIENT)
Age: 29
End: 2024-01-09

## 2024-01-09 LAB
T PALLIDUM AB TITR SER: NEGATIVE — SIGNIFICANT CHANGE UP
T PALLIDUM AB TITR SER: NEGATIVE — SIGNIFICANT CHANGE UP

## 2024-01-10 ENCOUNTER — INPATIENT (INPATIENT)
Facility: HOSPITAL | Age: 29
LOS: 1 days | Discharge: HOME | End: 2024-01-12
Attending: OBSTETRICS & GYNECOLOGY | Admitting: OBSTETRICS & GYNECOLOGY
Payer: MEDICAID

## 2024-01-10 VITALS
OXYGEN SATURATION: 95 % | HEART RATE: 92 BPM | WEIGHT: 160.06 LBS | RESPIRATION RATE: 17 BRPM | HEIGHT: 62 IN | TEMPERATURE: 98 F | DIASTOLIC BLOOD PRESSURE: 82 MMHG | SYSTOLIC BLOOD PRESSURE: 112 MMHG

## 2024-01-10 DIAGNOSIS — O34.219 MATERNAL CARE FOR UNSPECIFIED TYPE SCAR FROM PREVIOUS CESAREAN DELIVERY: ICD-10-CM

## 2024-01-10 DIAGNOSIS — Z90.49 ACQUIRED ABSENCE OF OTHER SPECIFIED PARTS OF DIGESTIVE TRACT: Chronic | ICD-10-CM

## 2024-01-10 DIAGNOSIS — Z98.891 HISTORY OF UTERINE SCAR FROM PREVIOUS SURGERY: ICD-10-CM

## 2024-01-10 LAB
HBV SURFACE AG SERPL QL IA: SIGNIFICANT CHANGE UP
HBV SURFACE AG SERPL QL IA: SIGNIFICANT CHANGE UP

## 2024-01-10 PROCEDURE — 86923 COMPATIBILITY TEST ELECTRIC: CPT

## 2024-01-10 PROCEDURE — G0463: CPT

## 2024-01-10 PROCEDURE — 59514 CESAREAN DELIVERY ONLY: CPT | Mod: U9

## 2024-01-10 PROCEDURE — 86780 TREPONEMA PALLIDUM: CPT

## 2024-01-10 RX ORDER — MORPHINE SULFATE 50 MG/1
0.2 CAPSULE, EXTENDED RELEASE ORAL ONCE
Refills: 0 | Status: DISCONTINUED | OUTPATIENT
Start: 2024-01-10 | End: 2024-01-12

## 2024-01-10 RX ORDER — OXYCODONE HYDROCHLORIDE 5 MG/1
5 TABLET ORAL
Refills: 0 | Status: DISCONTINUED | OUTPATIENT
Start: 2024-01-10 | End: 2024-01-12

## 2024-01-10 RX ORDER — CITRIC ACID/SODIUM CITRATE 300-500 MG
30 SOLUTION, ORAL ORAL ONCE
Refills: 0 | Status: COMPLETED | OUTPATIENT
Start: 2024-01-10 | End: 2024-01-10

## 2024-01-10 RX ORDER — CEFOTETAN DISODIUM 1 G
2 VIAL (EA) INJECTION EVERY 12 HOURS
Refills: 0 | Status: DISCONTINUED | OUTPATIENT
Start: 2024-01-10 | End: 2024-01-10

## 2024-01-10 RX ORDER — SODIUM CHLORIDE 9 MG/ML
1000 INJECTION, SOLUTION INTRAVENOUS
Refills: 0 | Status: DISCONTINUED | OUTPATIENT
Start: 2024-01-10 | End: 2024-01-12

## 2024-01-10 RX ORDER — SIMETHICONE 80 MG/1
80 TABLET, CHEWABLE ORAL EVERY 4 HOURS
Refills: 0 | Status: DISCONTINUED | OUTPATIENT
Start: 2024-01-10 | End: 2024-01-12

## 2024-01-10 RX ORDER — OXYTOCIN 10 UNIT/ML
333.33 VIAL (ML) INJECTION
Qty: 20 | Refills: 0 | Status: COMPLETED | OUTPATIENT
Start: 2024-01-10

## 2024-01-10 RX ORDER — SODIUM CHLORIDE 9 MG/ML
1000 INJECTION, SOLUTION INTRAVENOUS ONCE
Refills: 0 | Status: DISCONTINUED | OUTPATIENT
Start: 2024-01-10 | End: 2024-01-10

## 2024-01-10 RX ORDER — MAGNESIUM HYDROXIDE 400 MG/1
30 TABLET, CHEWABLE ORAL
Refills: 0 | Status: DISCONTINUED | OUTPATIENT
Start: 2024-01-10 | End: 2024-01-12

## 2024-01-10 RX ORDER — FAMOTIDINE 10 MG/ML
20 INJECTION INTRAVENOUS ONCE
Refills: 0 | Status: COMPLETED | OUTPATIENT
Start: 2024-01-10 | End: 2024-01-10

## 2024-01-10 RX ORDER — TETANUS TOXOID, REDUCED DIPHTHERIA TOXOID AND ACELLULAR PERTUSSIS VACCINE, ADSORBED 5; 2.5; 8; 8; 2.5 [IU]/.5ML; [IU]/.5ML; UG/.5ML; UG/.5ML; UG/.5ML
0.5 SUSPENSION INTRAMUSCULAR ONCE
Refills: 0 | Status: DISCONTINUED | OUTPATIENT
Start: 2024-01-10 | End: 2024-01-12

## 2024-01-10 RX ORDER — CEFAZOLIN SODIUM 1 G
2000 VIAL (EA) INJECTION ONCE
Refills: 0 | Status: DISCONTINUED | OUTPATIENT
Start: 2024-01-10 | End: 2024-01-10

## 2024-01-10 RX ORDER — SODIUM CHLORIDE 9 MG/ML
1000 INJECTION, SOLUTION INTRAVENOUS
Refills: 0 | Status: DISCONTINUED | OUTPATIENT
Start: 2024-01-10 | End: 2024-01-10

## 2024-01-10 RX ORDER — NALOXONE HYDROCHLORIDE 4 MG/.1ML
0.1 SPRAY NASAL
Refills: 0 | Status: DISCONTINUED | OUTPATIENT
Start: 2024-01-10 | End: 2024-01-12

## 2024-01-10 RX ORDER — ONDANSETRON 8 MG/1
4 TABLET, FILM COATED ORAL EVERY 6 HOURS
Refills: 0 | Status: DISCONTINUED | OUTPATIENT
Start: 2024-01-10 | End: 2024-01-12

## 2024-01-10 RX ORDER — METOCLOPRAMIDE HCL 10 MG
10 TABLET ORAL ONCE
Refills: 0 | Status: COMPLETED | OUTPATIENT
Start: 2024-01-10 | End: 2024-01-10

## 2024-01-10 RX ORDER — LANOLIN
1 OINTMENT (GRAM) TOPICAL EVERY 6 HOURS
Refills: 0 | Status: DISCONTINUED | OUTPATIENT
Start: 2024-01-10 | End: 2024-01-12

## 2024-01-10 RX ORDER — CHLORHEXIDINE GLUCONATE 213 G/1000ML
1 SOLUTION TOPICAL DAILY
Refills: 0 | Status: DISCONTINUED | OUTPATIENT
Start: 2024-01-10 | End: 2024-01-10

## 2024-01-10 RX ORDER — ACETAMINOPHEN 500 MG
975 TABLET ORAL
Refills: 0 | Status: DISCONTINUED | OUTPATIENT
Start: 2024-01-10 | End: 2024-01-12

## 2024-01-10 RX ORDER — OXYTOCIN 10 UNIT/ML
333.33 VIAL (ML) INJECTION
Qty: 20 | Refills: 0 | Status: DISCONTINUED | OUTPATIENT
Start: 2024-01-10 | End: 2024-01-12

## 2024-01-10 RX ORDER — DEXAMETHASONE 0.5 MG/5ML
4 ELIXIR ORAL EVERY 6 HOURS
Refills: 0 | Status: DISCONTINUED | OUTPATIENT
Start: 2024-01-10 | End: 2024-01-12

## 2024-01-10 RX ORDER — IBUPROFEN 200 MG
600 TABLET ORAL EVERY 6 HOURS
Refills: 0 | Status: COMPLETED | OUTPATIENT
Start: 2024-01-10 | End: 2024-12-08

## 2024-01-10 RX ORDER — OXYCODONE HYDROCHLORIDE 5 MG/1
5 TABLET ORAL ONCE
Refills: 0 | Status: DISCONTINUED | OUTPATIENT
Start: 2024-01-10 | End: 2024-01-12

## 2024-01-10 RX ORDER — HEPARIN SODIUM 5000 [USP'U]/ML
5000 INJECTION INTRAVENOUS; SUBCUTANEOUS EVERY 12 HOURS
Refills: 0 | Status: DISCONTINUED | OUTPATIENT
Start: 2024-01-10 | End: 2024-01-12

## 2024-01-10 RX ORDER — DIPHENHYDRAMINE HCL 50 MG
25 CAPSULE ORAL EVERY 6 HOURS
Refills: 0 | Status: DISCONTINUED | OUTPATIENT
Start: 2024-01-10 | End: 2024-01-12

## 2024-01-10 RX ORDER — KETOROLAC TROMETHAMINE 30 MG/ML
30 SYRINGE (ML) INJECTION EVERY 6 HOURS
Refills: 0 | Status: DISCONTINUED | OUTPATIENT
Start: 2024-01-10 | End: 2024-01-11

## 2024-01-10 RX ORDER — CEFAZOLIN SODIUM 1 G
2000 VIAL (EA) INJECTION ONCE
Refills: 0 | Status: COMPLETED | OUTPATIENT
Start: 2024-01-10 | End: 2024-01-10

## 2024-01-10 RX ADMIN — Medication 30 MILLIGRAM(S): at 19:55

## 2024-01-10 RX ADMIN — HEPARIN SODIUM 5000 UNIT(S): 5000 INJECTION INTRAVENOUS; SUBCUTANEOUS at 21:55

## 2024-01-10 RX ADMIN — Medication 30 MILLIGRAM(S): at 18:54

## 2024-01-10 RX ADMIN — Medication 30 MILLIGRAM(S): at 13:39

## 2024-01-10 RX ADMIN — Medication 30 MILLIGRAM(S): at 12:39

## 2024-01-10 RX ADMIN — Medication 100 MILLIGRAM(S): at 08:46

## 2024-01-10 RX ADMIN — Medication 10 MILLIGRAM(S): at 15:18

## 2024-01-10 RX ADMIN — Medication 1000 MILLIUNIT(S)/MIN: at 12:14

## 2024-01-10 RX ADMIN — ONDANSETRON 4 MILLIGRAM(S): 8 TABLET, FILM COATED ORAL at 10:40

## 2024-01-10 RX ADMIN — FAMOTIDINE 20 MILLIGRAM(S): 10 INJECTION INTRAVENOUS at 08:45

## 2024-01-10 RX ADMIN — Medication 30 MILLILITER(S): at 08:45

## 2024-01-10 NOTE — OB PROVIDER H&P - NSHPPHYSICALEXAM_GEN_ALL_CORE
Vital Signs Last 24 Hrs  T(C): 36.8 (10 Darian 2024 07:42), Max: 36.8 (10 Darian 2024 07:42)  T(F): 98.2 (10 Darian 2024 07:42), Max: 98.2 (10 Darian 2024 07:42)  HR: 92 (10 Darian 2024 07:42) (92 - 92)  RR: 17 (10 Darian 2024 07:42) (17 - 17)  SpO2: 95% (10 Darian 2024 07:42) (95% - 95%)  Gen: well-appearing, NAD, A&Ox3  Chest: saturating well on RA, CTA b/l   Abd: soft, non-tender, , no rebound tenderness/ rigidity/ guarding   Extremities: no calf tenderness/swelling   SVE: deferred  US: cephalic       Tracing: baseline 145, moderate variability, + accels, no decels    [place]: irregular contractions   Tracing:   [place]: Vital Signs Last 24 Hrs  T(C): 36.8 (10 Darian 2024 07:42), Max: 36.8 (10 Darian 2024 07:42)  T(F): 98.2 (10 Darian 2024 07:42), Max: 98.2 (10 Darian 2024 07:42)  HR: 92 (10 Darian 2024 07:42) (92 - 92)  RR: 17 (10 Darian 2024 07:42) (17 - 17)  SpO2: 95% (10 Darian 2024 07:42) (95% - 95%)  Gen: well-appearing, NAD, A&Ox3  Chest: saturating well on RA, CTA b/l   Abd: soft, non-tender, , no rebound tenderness/ rigidity/ guarding   Extremities: no calf tenderness/swelling   SVE: deferred  US: cephalic       Tracing: baseline 145, moderate variability, + accels, no decels    [place]: irregular contractions   Tracing:   [place]:

## 2024-01-10 NOTE — OB NEONATOLOGY/PEDIATRICIAN DELIVERY SUMMARY - NSPEDSNEONOTESA_OBGYN_ALL_OB_FT
Neonatologist, myself, called to the scheduled, repeat  of a 29yo . EGA 38 weeks, 5 days. Maternal Serologies: GBS unknown/HIV-/HepB pending/RPR-/Rubella Immune. Pregnancy reportedly uncomplicated otherwise. Artificial rupture of membranes was just prior to delivery and significant for clear fluid. Delivery significant for a live, viable, medrano male. Vertex presentation. Baby notably cried at delivery, was suctioned by OB, cord clamping delayed for approximately 30 seconds. Baby subsequently brought to the radiant warmer where Peds assumed care. Baby was warmed/dried/stimulated, bulb suction of the nose and mouth performed. Baby did not require supplemental O2. APGARs 8/9 at 1 and 5 minutes of life respectively. Baby stable to remain on  Nursery Service to continue couplet care with mother. Parents updated in OR at mother's bedside. Neonatologist, myself, called to the scheduled, repeat  of a 27yo . EGA 38 weeks, 5 days. Maternal Serologies: GBS unknown/HIV-/HepB pending/RPR-/Rubella Immune. Pregnancy reportedly uncomplicated otherwise. Artificial rupture of membranes was just prior to delivery and significant for clear fluid. Delivery significant for a live, viable, medrano male. Vertex presentation. Baby notably cried at delivery, was suctioned by OB, cord clamping delayed for approximately 30 seconds. Baby subsequently brought to the radiant warmer where Peds assumed care. Baby was warmed/dried/stimulated, bulb suction of the nose and mouth performed. Baby did not require supplemental O2. APGARs 8/9 at 1 and 5 minutes of life respectively. Baby stable to remain on  Nursery Service to continue couplet care with mother. Parents updated in OR at mother's bedside.

## 2024-01-10 NOTE — PROGRESS NOTE ADULT - ASSESSMENT
29 y/o F POD 0 scheduled rpt c/s @ 39 weeks with Dr. Rojas. patient tolerated procedure well and was transferred to postpartum in stable condition.     - continue post op care  - continue pain regimen    - continue to monitor vitals   - heparin DVT ppx SCDS  - advance diet once passing flatus   - repeat /60  - d/c Richard in AM   - d/w Dr. Rojas

## 2024-01-10 NOTE — OB RN DELIVERY SUMMARY - NSSELHIDDEN_OBGYN_ALL_OB_FT
[NS_DeliveryAttending1_OBGYN_ALL_OB_FT:[email or web address]=],[NS_DeliveryAssist1_OBGYN_ALL_OB_FT:[id number]==] [NS_DeliveryAttending1_OBGYN_ALL_OB_FT:MTUxMDExOTA=],[NS_DeliveryAssist1_OBGYN_ALL_OB_FT:[id number]==]

## 2024-01-10 NOTE — OB PROVIDER DELIVERY SUMMARY - NSPROVIDERDELIVERYNOTE_OBGYN_ALL_OB_FT
Delivered via repeat lst CS liveborn male infant apgar 9/9.  Placenta delivered intact.  Uterus firm  Hysterotomy closed with vicryl. Fascia , subcutaneous layer and skin reapproximated in usual fashion.. Steristrips applied Mother and baby stable.    IVF-2400  urine-250  ebl-800 Delivered via repeat lst CS liveborn male infant apgar 9/9.  Placenta delivered intact.  Uterus firm  Hysterotomy closed with vicryl. Fascia , subcutaneous layer and skin reapproximated in usual fashion.. Steristrips applied Mother and baby stable.    IVF-2400  urine-250  ebl-800    dict#[id number] Delivered via repeat lst CS liveborn male infant apgar 9/9.  Placenta delivered intact.  Uterus firm  Hysterotomy closed with vicryl. Fascia , subcutaneous layer and skin reapproximated in usual fashion.. Steristrips applied Mother and baby stable.    IVF-2400  urine-250  ebl-800    dict#[id number]

## 2024-01-10 NOTE — OB PROVIDER H&P - HISTORY OF PRESENT ILLNESS
27 Y/O radha 24 lmp; 23  @ 39 weeks presents for repeat scheduled c/s. Patient states she overall feels well except for mild congestion she has had for a few weeks.   + FM, denies VB, LOF, CTX     PNC: [facility]   1st visit: 20 weeks    last visit 2023   c/b low lying placenta, anemia in pregnancy ( no hx of transfusion)  OBHx:   (1) 2020 SAb - 5 weeks   (2) 10/13/2021 - c/s - 42 weeks 3 days - 3800g - F c/b IUFD of baby B @ 12 weeks   PMHx: none   MEds: prenatals, iron BID   PSHx; c/s , appendectomy  ; denies reactions to anesthesia   Allergies: none   Social: denies tobacco use, EtOH use, illicit drug use   Psych: denies anxiety, depression PTSD, abuse    27 Y/O radha 24 lmp; 23  @ 39 weeks presents for repeat scheduled c/s. Patient states she overall feels well except for mild congestion she has had for a few weeks.   + FM, denies VB, LOF, CTX     PNC: [facility]   1st visit: 20 weeks    last visit 2023   c/b low lying placenta, anemia in pregnancy ( no hx of transfusion)  OBHx:   (1) 2020 SAb - 5 weeks   (2) 10/13/2021 - c/s - 42 weeks 3 days - 3800g - F c/b IUFD of baby B @ 12 weeks   PMHx: none   MEds: prenatals, iron BID   PSHx; c/s , appendectomy  ; denies reactions to anesthesia   Allergies: none   Social: denies tobacco use, EtOH use, illicit drug use   Psych: denies anxiety, depression PTSD, abuse

## 2024-01-10 NOTE — OB RN PATIENT PROFILE - FALL HARM RISK - UNIVERSAL INTERVENTIONS
Bed in lowest position, wheels locked, appropriate side rails in place/Call bell, personal items and telephone in reach/Instruct patient to call for assistance before getting out of bed or chair/Non-slip footwear when patient is out of bed/[place] to call system/Physically safe environment - no spills, clutter or unnecessary equipment/Purposeful Proactive Rounding/Room/bathroom lighting operational, light cord in reach Bed in lowest position, wheels locked, appropriate side rails in place/Call bell, personal items and telephone in reach/Instruct patient to call for assistance before getting out of bed or chair/Non-slip footwear when patient is out of bed/[place] to call system/Physically safe environment - no spills, clutter or unnecessary equipment/Purposeful Proactive Rounding/Room/bathroom lighting operational, light cord in reach

## 2024-01-10 NOTE — PROGRESS NOTE ADULT - SUBJECTIVE AND OBJECTIVE BOX
Patient had few episodes of nausea and vomiting in PACu however in postpartum she is feeling better. Patient states she has not yet eaten/drank anything. [place] in place clear yellow urine in place. Has not ambulated.  at bedside. Denies headache, SOB, difficulty breathing, chest pain, nausea, vomiting, abdominal pain.     Vital Signs Last 24 Hrs  T(C): 36.8 (10 Darian 2024 14:05), Max: 36.8 (10 Darian 2024 07:42)  T(F): 98.2 (10 Darian 2024 14:05), Max: 98.2 (10 Darian 2024 07:42)  HR: 91 (10 Darian 2024 12:30) (70 - 92)  BP: 89/54 (10 Darian 2024 14:05) (89/54 - 112/82)  BP(mean): 74 (10 Darian 2024 12:30) (74 - 85)  RR: 17 (10 Darian 2024 14:05) (10 - 21)  SpO2: 98% (10 Darian 2024 14:05) (95% - 100%)    Gen: well-appearing, NAD, A&Ox3  Chest: saturating well on RA,   Abd: soft, non-tender, fundus firm, no rebound tenderness/ rigidity/ guarding   Extremities: no calf tenderness/swelling   GYN: minimal lochia  : weems in place with clear yellow urine
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
## 2024-01-10 NOTE — OB PROVIDER H&P - ASSESSMENT
29 Y/O radha 24 lmp; 23  @ 39 weeks presents for repeat scheduled c/s.      - admit to L&D for repeat c/s  - continue fetal/maternal monitoring   - anesthesia aware; consents obtained  - consents obtained by Dr. Rojas   - admission labs   - d/w Dr. Rojas    27 Y/O radha 24 lmp; 23  @ 39 weeks presents for repeat scheduled c/s.      - admit to L&D for repeat c/s  - continue fetal/maternal monitoring   - anesthesia aware; consents obtained  - consents obtained by Dr. Rojas   - admission labs   - d/w Dr. Rojas

## 2024-01-10 NOTE — OB PROVIDER DELIVERY SUMMARY - NSSELHIDDEN_OBGYN_ALL_OB_FT
[NS_DeliveryAttending1_OBGYN_ALL_OB_FT:[id number]=],[NS_DeliveryAssist1_OBGYN_ALL_OB_FT:[id number]==] [NS_DeliveryAttending1_OBGYN_ALL_OB_FT:[email or web address]=],[NS_DeliveryAssist1_OBGYN_ALL_OB_FT:[id number]==]

## 2024-01-10 NOTE — OB RN PATIENT PROFILE - BREASTFEEDING MOTHER TAUGHT HOW TO HAND EXPRESS THEIR OWN MILK
"Chief Complaint   Patient presents with     Physical       Initial /70 (BP Location: Left arm, Patient Position: Chair, Cuff Size: Adult Large)   Pulse 73   Temp 98.5  F (36.9  C) (Tympanic)   Resp 16   Ht 1.753 m (5' 9\")   Wt 102.1 kg (225 lb)   SpO2 97%   BMI 33.23 kg/m   Estimated body mass index is 33.23 kg/m  as calculated from the following:    Height as of this encounter: 1.753 m (5' 9\").    Weight as of this encounter: 102.1 kg (225 lb).  Medication Reconciliation: complete  Nancy Aguilera LPN    "
Statement Selected

## 2024-01-10 NOTE — OB RN INTRAOPERATIVE NOTE - NSSELHIDDEN_OBGYN_ALL_OB_FT
[NS_DeliveryAttending1_OBGYN_ALL_OB_FT:[email or web address]=],[NS_DeliveryAssist1_OBGYN_ALL_OB_FT:[id number]==] [NS_DeliveryAttending1_OBGYN_ALL_OB_FT:MTUxMDExOTA=],[NS_DeliveryAssist1_OBGYN_ALL_OB_FT:[id number]==]

## 2024-01-11 ENCOUNTER — TRANSCRIPTION ENCOUNTER (OUTPATIENT)
Age: 29
End: 2024-01-11

## 2024-01-11 LAB
BASOPHILS # BLD AUTO: 0.04 K/UL — SIGNIFICANT CHANGE UP (ref 0–0.2)
BASOPHILS # BLD AUTO: 0.04 K/UL — SIGNIFICANT CHANGE UP (ref 0–0.2)
BASOPHILS NFR BLD AUTO: 0.3 % — SIGNIFICANT CHANGE UP (ref 0–2)
BASOPHILS NFR BLD AUTO: 0.3 % — SIGNIFICANT CHANGE UP (ref 0–2)
EOSINOPHIL # BLD AUTO: 0.11 K/UL — SIGNIFICANT CHANGE UP (ref 0–0.5)
EOSINOPHIL # BLD AUTO: 0.11 K/UL — SIGNIFICANT CHANGE UP (ref 0–0.5)
EOSINOPHIL NFR BLD AUTO: 0.8 % — SIGNIFICANT CHANGE UP (ref 0–6)
EOSINOPHIL NFR BLD AUTO: 0.8 % — SIGNIFICANT CHANGE UP (ref 0–6)
HCT VFR BLD CALC: 28.9 % — LOW (ref 34.5–45)
HCT VFR BLD CALC: 28.9 % — LOW (ref 34.5–45)
HGB BLD-MCNC: 9.4 G/DL — LOW (ref 11.5–15.5)
HGB BLD-MCNC: 9.4 G/DL — LOW (ref 11.5–15.5)
IMM GRANULOCYTES NFR BLD AUTO: 1.4 % — HIGH (ref 0–0.9)
IMM GRANULOCYTES NFR BLD AUTO: 1.4 % — HIGH (ref 0–0.9)
LYMPHOCYTES # BLD AUTO: 1.66 K/UL — SIGNIFICANT CHANGE UP (ref 1–3.3)
LYMPHOCYTES # BLD AUTO: 1.66 K/UL — SIGNIFICANT CHANGE UP (ref 1–3.3)
LYMPHOCYTES # BLD AUTO: 12.1 % — LOW (ref 13–44)
LYMPHOCYTES # BLD AUTO: 12.1 % — LOW (ref 13–44)
MCHC RBC-ENTMCNC: 27.2 PG — SIGNIFICANT CHANGE UP (ref 27–34)
MCHC RBC-ENTMCNC: 27.2 PG — SIGNIFICANT CHANGE UP (ref 27–34)
MCHC RBC-ENTMCNC: 32.5 GM/DL — SIGNIFICANT CHANGE UP (ref 32–36)
MCHC RBC-ENTMCNC: 32.5 GM/DL — SIGNIFICANT CHANGE UP (ref 32–36)
MCV RBC AUTO: 83.8 FL — SIGNIFICANT CHANGE UP (ref 80–100)
MCV RBC AUTO: 83.8 FL — SIGNIFICANT CHANGE UP (ref 80–100)
MONOCYTES # BLD AUTO: 0.67 K/UL — SIGNIFICANT CHANGE UP (ref 0–0.9)
MONOCYTES # BLD AUTO: 0.67 K/UL — SIGNIFICANT CHANGE UP (ref 0–0.9)
MONOCYTES NFR BLD AUTO: 4.9 % — SIGNIFICANT CHANGE UP (ref 2–14)
MONOCYTES NFR BLD AUTO: 4.9 % — SIGNIFICANT CHANGE UP (ref 2–14)
NEUTROPHILS # BLD AUTO: 11.05 K/UL — HIGH (ref 1.8–7.4)
NEUTROPHILS # BLD AUTO: 11.05 K/UL — HIGH (ref 1.8–7.4)
NEUTROPHILS NFR BLD AUTO: 80.5 % — HIGH (ref 43–77)
NEUTROPHILS NFR BLD AUTO: 80.5 % — HIGH (ref 43–77)
NRBC # BLD: 0 /100 WBCS — SIGNIFICANT CHANGE UP (ref 0–0)
NRBC # BLD: 0 /100 WBCS — SIGNIFICANT CHANGE UP (ref 0–0)
PLATELET # BLD AUTO: 158 K/UL — SIGNIFICANT CHANGE UP (ref 150–400)
PLATELET # BLD AUTO: 158 K/UL — SIGNIFICANT CHANGE UP (ref 150–400)
RBC # BLD: 3.45 M/UL — LOW (ref 3.8–5.2)
RBC # BLD: 3.45 M/UL — LOW (ref 3.8–5.2)
RBC # FLD: 20.5 % — HIGH (ref 10.3–14.5)
RBC # FLD: 20.5 % — HIGH (ref 10.3–14.5)
WBC # BLD: 13.72 K/UL — HIGH (ref 3.8–10.5)
WBC # BLD: 13.72 K/UL — HIGH (ref 3.8–10.5)
WBC # FLD AUTO: 13.72 K/UL — HIGH (ref 3.8–10.5)
WBC # FLD AUTO: 13.72 K/UL — HIGH (ref 3.8–10.5)

## 2024-01-11 RX ORDER — FAMOTIDINE 10 MG/ML
20 INJECTION INTRAVENOUS
Refills: 0 | Status: DISCONTINUED | OUTPATIENT
Start: 2024-01-11 | End: 2024-01-12

## 2024-01-11 RX ORDER — IBUPROFEN 200 MG
600 TABLET ORAL EVERY 6 HOURS
Refills: 0 | Status: DISCONTINUED | OUTPATIENT
Start: 2024-01-11 | End: 2024-01-12

## 2024-01-11 RX ADMIN — Medication 975 MILLIGRAM(S): at 05:10

## 2024-01-11 RX ADMIN — Medication 600 MILLIGRAM(S): at 18:50

## 2024-01-11 RX ADMIN — Medication 975 MILLIGRAM(S): at 21:02

## 2024-01-11 RX ADMIN — Medication 975 MILLIGRAM(S): at 04:12

## 2024-01-11 RX ADMIN — FAMOTIDINE 20 MILLIGRAM(S): 10 INJECTION INTRAVENOUS at 18:50

## 2024-01-11 RX ADMIN — Medication 975 MILLIGRAM(S): at 22:02

## 2024-01-11 RX ADMIN — Medication 975 MILLIGRAM(S): at 15:18

## 2024-01-11 RX ADMIN — HEPARIN SODIUM 5000 UNIT(S): 5000 INJECTION INTRAVENOUS; SUBCUTANEOUS at 21:02

## 2024-01-11 RX ADMIN — Medication 975 MILLIGRAM(S): at 16:18

## 2024-01-11 RX ADMIN — Medication 600 MILLIGRAM(S): at 19:50

## 2024-01-11 RX ADMIN — Medication 600 MILLIGRAM(S): at 11:39

## 2024-01-11 RX ADMIN — Medication 600 MILLIGRAM(S): at 12:39

## 2024-01-11 RX ADMIN — HEPARIN SODIUM 5000 UNIT(S): 5000 INJECTION INTRAVENOUS; SUBCUTANEOUS at 11:39

## 2024-01-11 NOTE — PROGRESS NOTE ADULT - ASSESSMENT
A/P: 29 yo  POD #1 s/p c/s @ 39w0d, h/o anemia in pregnancy uncomplicated postpartum care.  pt stable    -Pain management as needed  -DVT ppx: OOB and ambulate  -f/u Rpt CBC in am   - Labs reviewed   - continue current care   -Advance diet to regular  -Encourage breastfeeding     -d/w Dr Wright A/P: 27 yo  POD #1 s/p c/s @ 39w0d, h/o anemia in pregnancy uncomplicated postpartum care.  pt stable    -Pain management as needed  -DVT ppx: OOB and ambulate  -f/u Rpt CBC in am   - Labs reviewed   - continue current care   -Advance diet to regular  -Encourage breastfeeding     -d/w Dr Wright

## 2024-01-11 NOTE — LACTATION INITIAL EVALUATION - LACTATION INTERVENTIONS
Breastfeeding on cue 8-12X/24 hours with diaper count to assess for adequate intake, safe skin to skin and rooming-in encouraged./initiate/review safe skin-to-skin/initiate/review hand expression/reviewed components of an effective feeding and at least 8 effective feedings per day required/reviewed importance of monitoring infant diapers, the breastfeeding log, and minimum output each day/reviewed risks of unnecessary formula supplementation/reviewed risks of artificial nipples/reviewed benefits and recommendations for rooming in/reviewed feeding on demand/by cue at least 8 times a day
Breastfeeding on cue 8-12X/24 hours with diaper count to assess for adequate intake, safe skin to skin and rooming-in encouraged./initiate/review safe skin-to-skin/initiate/review hand expression/reviewed components of an effective feeding and at least 8 effective feedings per day required/reviewed importance of monitoring infant diapers, the breastfeeding log, and minimum output each day/reviewed risks of unnecessary formula supplementation/reviewed risks of artificial nipples/reviewed benefits and recommendations for rooming in/reviewed feeding on demand/by cue at least 8 times a day

## 2024-01-11 NOTE — DISCHARGE NOTE OB - MEDICATION SUMMARY - MEDICATIONS TO TAKE
I will START or STAY ON the medications listed below when I get home from the hospital:    ibuprofen 600 mg oral tablet  -- 1 tab(s) by mouth every 6 hours  -- Indication: For pain    ferrous sulfate 325 mg (65 mg elemental iron) oral tablet  -- 1 tab(s) by mouth 2 times a day  -- Indication: For Supplement    Colace 100 mg oral capsule  -- 1 cap(s) by mouth 2 times a day  -- Indication: For Constipation    ascorbic acid 500 mg oral capsule  -- 1 cap(s) by mouth once a day  -- Indication: For Supplement

## 2024-01-11 NOTE — DISCHARGE NOTE OB - PROVIDER TOKENS
PROVIDER:[TOKEN:[[id number]],FOLLOWUP:[2 weeks]] PROVIDER:[TOKEN:[[id number]],FOLLOWUP:[2 weeks]]

## 2024-01-11 NOTE — DISCHARGE NOTE OB - CARE PLAN
Principal Discharge DX:	S/P repeat low transverse   Assessment and plan of treatment:	Continue breastfeeding.  Motrin as needed for pain.  Ambulate daily.  No heavy lifting or anything per vagina x 6 weeks - no sex, tampons, douching, tub baths, etc.  Follow up in office in 1-2 weeks for incision check, and then at 6 weeks for postpartum check.  Secondary Diagnosis:	Chronic anemia  Assessment and plan of treatment:	take iron, folic acid, vitamin C, and prenatal vitamins. eat iron fortified food. Please take iron tablets daily. Return if any dizziness, shortness of breath, palpitations, chest pain or any other acute symptoms. Please have caution when holding baby to prevent any falls or dizziness with baby in arms.   1 Principal Discharge DX:	S/P repeat low transverse   Assessment and plan of treatment:	Continue breastfeeding.  Motrin as needed for pain.  Ambulate daily.  No heavy lifting or anything per vagina x 6 weeks - no sex, tampons, douching, tub baths, etc.  Follow up in office in 1-2 weeks for incision check, and then at 6 weeks for postpartum check.  Secondary Diagnosis:	Chronic anemia  Assessment and plan of treatment:	take iron, folic acid, vitamin C, and prenatal vitamins. eat iron fortified food. Please take iron tablets twice daily. Return if any dizziness, shortness of breath, palpitations, chest pain or any other acute symptoms. Please have caution when holding baby to prevent any falls or dizziness with baby in arms.

## 2024-01-11 NOTE — DISCHARGE NOTE OB - CARE PROVIDERS DIRECT ADDRESSES
[email or web address]

## 2024-01-11 NOTE — DISCHARGE NOTE OB - PATIENT PORTAL LINK FT
You can access the FollowMyHealth Patient Portal offered by [facility] by registering at the following website: [email or web address]. By joining [employer]’s FollowMyHealth portal, you will also be able to view your health information using other applications (apps) compatible with our system. You can access the FollowMyHealth Patient Portal offered by [facility] by registering at the following website: [email or web address]. By joining [employer]’s FollowMyHealth portal, you will also be able to view your health information using other applications (apps) compatible with our system.

## 2024-01-11 NOTE — DISCHARGE NOTE OB - HOSPITAL COURSE
27yo s/p scheduled rpt c/s @ 39wks, uncomplicated delivery and postpartum care, pt stable  29yo s/p scheduled rpt c/s @ 39wks, uncomplicated delivery and postpartum care, pt stable

## 2024-01-11 NOTE — DISCHARGE NOTE OB - CARE PROVIDER_API CALL
Selena Wright  Obstetrics and Gynecology  [address], [address]nd [place] [address]  Phone: [phone]  Fax: [phone]  Follow Up Time: 2 weeks   Selena Wright  Obstetrics and Gynecology  [address], [address]nd [place] [address]  Phone: [phone]  Fax: [phone]  Follow Up Time: 2 weeks

## 2024-01-11 NOTE — DISCHARGE NOTE OB - PLAN OF CARE
take iron, folic acid, vitamin C, and prenatal vitamins. eat iron fortified food. Please take iron tablets daily. Return if any dizziness, shortness of breath, palpitations, chest pain or any other acute symptoms. Please have caution when holding baby to prevent any falls or dizziness with baby in arms. Continue breastfeeding.  Motrin as needed for pain.  Ambulate daily.  No heavy lifting or anything per vagina x 6 weeks - no sex, tampons, douching, tub baths, etc.  Follow up in office in 1-2 weeks for incision check, and then at 6 weeks for postpartum check. take iron, folic acid, vitamin C, and prenatal vitamins. eat iron fortified food. Please take iron tablets twice daily. Return if any dizziness, shortness of breath, palpitations, chest pain or any other acute symptoms. Please have caution when holding baby to prevent any falls or dizziness with baby in arms.

## 2024-01-11 NOTE — PROGRESS NOTE ADULT - SUBJECTIVE AND OBJECTIVE BOX
[place]  [id number] (Mike)   Patient seen at bedside resting comfortably offers no new complaints. + amulation , + void  + flatus;  no bowel movement; tolerating clear liquid diet.  Pt both breast and bottle feeding. Pt denies headache, weakness, chest pain, shortness of breath, blurry vision or epigastric pain, N/V/D,  palpitations, worsening vaginal bleeding.    Vital Signs Last 24 Hrs  T(C): 36.9 (2024 03:28), Max: 36.9 (2024 03:28)  T(F): 98.4 (2024 03:28), Max: 98.4 (2024 03:28)  HR: 81 (2024 03:28) (71 - 91)  BP: 94/62 (2024 03:28) (88/59 - 100/63)  BP(mean): 74 (10 Darian 2024 12:30) (74 - 82)  RR: 17 (2024 03:28) (17 - 21)  SpO2: 100% (2024 03:28) (98% - 100%)    Parameters below as of 2024 03:28  Patient On (Oxygen Delivery Method): room air    Gen: A&O x 3, NAD  Breast: Soft, nontender, nonengorged  Abdomen: +BS; soft; Nontender, nondistended; dressing removed incision C/D/I steri strips in place, Fundus firma dn below the umbilicus   Gyn: minimal lochia   Extremities: Nontender, no calf tenderness                          9.4    13.72 )-----------( 158      ( 2024 06:06 )             28.9       A/P: 27 yo  POD #1 s/p c/s @ 39w0d, h/o anemia in pregnancy uncomplicated postpartum care.  pt stable    -Pain management as needed  -DVT ppx: OOB and ambulate  -f/u Rpt CBC in am   - Labs reviewed   - continue current care   -Advance diet to regular  -Encourage breastfeeding     -d/w Dr Wright Citizen of [place]  [id number] (Mike)   Patient seen at bedside resting comfortably offers no new complaints. + amulation , + void  + flatus;  no bowel movement; tolerating clear liquid diet.  Pt both breast and bottle feeding. Pt denies headache, weakness, chest pain, shortness of breath, blurry vision or epigastric pain, N/V/D,  palpitations, worsening vaginal bleeding.    Vital Signs Last 24 Hrs  T(C): 36.9 (2024 03:28), Max: 36.9 (2024 03:28)  T(F): 98.4 (2024 03:28), Max: 98.4 (2024 03:28)  HR: 81 (2024 03:28) (71 - 91)  BP: 94/62 (2024 03:28) (88/59 - 100/63)  BP(mean): 74 (10 Darian 2024 12:30) (74 - 82)  RR: 17 (2024 03:28) (17 - 21)  SpO2: 100% (2024 03:28) (98% - 100%)    Parameters below as of 2024 03:28  Patient On (Oxygen Delivery Method): room air    Gen: A&O x 3, NAD  Breast: Soft, nontender, nonengorged  Abdomen: +BS; soft; Nontender, nondistended; dressing removed incision C/D/I steri strips in place, Fundus firma dn below the umbilicus   Gyn: minimal lochia   Extremities: Nontender, no calf tenderness                          9.4    13.72 )-----------( 158      ( 2024 06:06 )             28.9       A/P: 27 yo  POD #1 s/p c/s @ 39w0d, h/o anemia in pregnancy uncomplicated postpartum care.  pt stable    -Pain management as needed  -DVT ppx: OOB and ambulate  -f/u Rpt CBC in am   - Labs reviewed   - continue current care   -Advance diet to regular  -Encourage breastfeeding     -d/w Dr Wright

## 2024-01-11 NOTE — DISCHARGE NOTE OB - NS MD DC FALL RISK RISK
For information on Fall & Injury Prevention, visit: [email or web address] OR  [email or web address] OR  https://www.cdc.gov/steadi/patient.html For information on Fall & Injury Prevention, visit: [email or web address] OR  [email or web address] OR  https://www.cdc.gov/steadi/patient.html

## 2024-01-12 VITALS
SYSTOLIC BLOOD PRESSURE: 100 MMHG | HEART RATE: 84 BPM | OXYGEN SATURATION: 98 % | TEMPERATURE: 98 F | RESPIRATION RATE: 18 BRPM | DIASTOLIC BLOOD PRESSURE: 60 MMHG

## 2024-01-12 DIAGNOSIS — D64.9 ANEMIA, UNSPECIFIED: ICD-10-CM

## 2024-01-12 LAB
BASOPHILS # BLD AUTO: 0.04 K/UL — SIGNIFICANT CHANGE UP (ref 0–0.2)
BASOPHILS # BLD AUTO: 0.04 K/UL — SIGNIFICANT CHANGE UP (ref 0–0.2)
BASOPHILS NFR BLD AUTO: 0.4 % — SIGNIFICANT CHANGE UP (ref 0–2)
BASOPHILS NFR BLD AUTO: 0.4 % — SIGNIFICANT CHANGE UP (ref 0–2)
EOSINOPHIL # BLD AUTO: 0.28 K/UL — SIGNIFICANT CHANGE UP (ref 0–0.5)
EOSINOPHIL # BLD AUTO: 0.28 K/UL — SIGNIFICANT CHANGE UP (ref 0–0.5)
EOSINOPHIL NFR BLD AUTO: 2.8 % — SIGNIFICANT CHANGE UP (ref 0–6)
EOSINOPHIL NFR BLD AUTO: 2.8 % — SIGNIFICANT CHANGE UP (ref 0–6)
HCT VFR BLD CALC: 25.4 % — LOW (ref 34.5–45)
HCT VFR BLD CALC: 25.4 % — LOW (ref 34.5–45)
HGB BLD-MCNC: 8.1 G/DL — LOW (ref 11.5–15.5)
HGB BLD-MCNC: 8.1 G/DL — LOW (ref 11.5–15.5)
IMM GRANULOCYTES NFR BLD AUTO: 1.4 % — HIGH (ref 0–0.9)
IMM GRANULOCYTES NFR BLD AUTO: 1.4 % — HIGH (ref 0–0.9)
LYMPHOCYTES # BLD AUTO: 1.78 K/UL — SIGNIFICANT CHANGE UP (ref 1–3.3)
LYMPHOCYTES # BLD AUTO: 1.78 K/UL — SIGNIFICANT CHANGE UP (ref 1–3.3)
LYMPHOCYTES # BLD AUTO: 18 % — SIGNIFICANT CHANGE UP (ref 13–44)
LYMPHOCYTES # BLD AUTO: 18 % — SIGNIFICANT CHANGE UP (ref 13–44)
MCHC RBC-ENTMCNC: 26.9 PG — LOW (ref 27–34)
MCHC RBC-ENTMCNC: 26.9 PG — LOW (ref 27–34)
MCHC RBC-ENTMCNC: 31.9 GM/DL — LOW (ref 32–36)
MCHC RBC-ENTMCNC: 31.9 GM/DL — LOW (ref 32–36)
MCV RBC AUTO: 84.4 FL — SIGNIFICANT CHANGE UP (ref 80–100)
MCV RBC AUTO: 84.4 FL — SIGNIFICANT CHANGE UP (ref 80–100)
MONOCYTES # BLD AUTO: 0.56 K/UL — SIGNIFICANT CHANGE UP (ref 0–0.9)
MONOCYTES # BLD AUTO: 0.56 K/UL — SIGNIFICANT CHANGE UP (ref 0–0.9)
MONOCYTES NFR BLD AUTO: 5.7 % — SIGNIFICANT CHANGE UP (ref 2–14)
MONOCYTES NFR BLD AUTO: 5.7 % — SIGNIFICANT CHANGE UP (ref 2–14)
NEUTROPHILS # BLD AUTO: 7.1 K/UL — SIGNIFICANT CHANGE UP (ref 1.8–7.4)
NEUTROPHILS # BLD AUTO: 7.1 K/UL — SIGNIFICANT CHANGE UP (ref 1.8–7.4)
NEUTROPHILS NFR BLD AUTO: 71.7 % — SIGNIFICANT CHANGE UP (ref 43–77)
NEUTROPHILS NFR BLD AUTO: 71.7 % — SIGNIFICANT CHANGE UP (ref 43–77)
NRBC # BLD: 0 /100 WBCS — SIGNIFICANT CHANGE UP (ref 0–0)
NRBC # BLD: 0 /100 WBCS — SIGNIFICANT CHANGE UP (ref 0–0)
PLATELET # BLD AUTO: 164 K/UL — SIGNIFICANT CHANGE UP (ref 150–400)
PLATELET # BLD AUTO: 164 K/UL — SIGNIFICANT CHANGE UP (ref 150–400)
RBC # BLD: 3.01 M/UL — LOW (ref 3.8–5.2)
RBC # BLD: 3.01 M/UL — LOW (ref 3.8–5.2)
RBC # FLD: 21.1 % — HIGH (ref 10.3–14.5)
RBC # FLD: 21.1 % — HIGH (ref 10.3–14.5)
WBC # BLD: 9.9 K/UL — SIGNIFICANT CHANGE UP (ref 3.8–10.5)
WBC # BLD: 9.9 K/UL — SIGNIFICANT CHANGE UP (ref 3.8–10.5)
WBC # FLD AUTO: 9.9 K/UL — SIGNIFICANT CHANGE UP (ref 3.8–10.5)
WBC # FLD AUTO: 9.9 K/UL — SIGNIFICANT CHANGE UP (ref 3.8–10.5)

## 2024-01-12 PROCEDURE — 59050 FETAL MONITOR W/REPORT: CPT

## 2024-01-12 PROCEDURE — 36415 COLL VENOUS BLD VENIPUNCTURE: CPT

## 2024-01-12 PROCEDURE — 87340 HEPATITIS B SURFACE AG IA: CPT

## 2024-01-12 PROCEDURE — 59025 FETAL NON-STRESS TEST: CPT

## 2024-01-12 PROCEDURE — 85025 COMPLETE CBC W/AUTO DIFF WBC: CPT

## 2024-01-12 RX ORDER — FERROUS SULFATE 325(65) MG
1 TABLET ORAL
Qty: 60 | Refills: 0
Start: 2024-01-12 | End: 2024-02-10

## 2024-01-12 RX ORDER — IBUPROFEN 200 MG
1 TABLET ORAL
Qty: 56 | Refills: 0
Start: 2024-01-12 | End: 2024-01-25

## 2024-01-12 RX ORDER — DOCUSATE SODIUM 100 MG
1 CAPSULE ORAL
Qty: 60 | Refills: 0
Start: 2024-01-12 | End: 2024-02-10

## 2024-01-12 RX ORDER — ASCORBIC ACID 60 MG
1 TABLET,CHEWABLE ORAL
Qty: 30 | Refills: 0
Start: 2024-01-12 | End: 2024-02-10

## 2024-01-12 RX ADMIN — Medication 600 MILLIGRAM(S): at 00:27

## 2024-01-12 RX ADMIN — Medication 600 MILLIGRAM(S): at 07:06

## 2024-01-12 RX ADMIN — Medication 600 MILLIGRAM(S): at 06:06

## 2024-01-12 RX ADMIN — Medication 975 MILLIGRAM(S): at 09:20

## 2024-01-12 RX ADMIN — Medication 975 MILLIGRAM(S): at 08:20

## 2024-01-12 RX ADMIN — FAMOTIDINE 20 MILLIGRAM(S): 10 INJECTION INTRAVENOUS at 06:06

## 2024-01-12 RX ADMIN — Medication 600 MILLIGRAM(S): at 01:27

## 2024-01-12 NOTE — PROGRESS NOTE ADULT - PROBLEM SELECTOR PLAN 1
A/P: 29 yo  POD #1 s/p c/s @ 39w0d, h/o anemia in pregnancy uncomplicated postpartum care.  pt stable    -Pain management as needed  -DVT ppx: OOB and ambulate  -f/u Rpt CBC in am   - Labs reviewed   - continue current care   -Advance diet to regular  -Encourage breastfeeding     -d/w Dr Wright
-oral iron / vitamin c   -Pain management as needed  -cont post op care  -OOB and ambulate  -encourage incentive spirometer use  -Encourage breastfeeding   -discharge home today  -d/w dr. Winters

## 2024-01-12 NOTE — PROGRESS NOTE ADULT - SUBJECTIVE AND OBJECTIVE BOX
Patient seen at bedisde resting comfortably offers no new complaints. + Ambulation, + void without difficulty, + flatus; tolerating regular diet. both breastfeeding and bottle feeding. Denies HA, CP, SOB, N/V/D,  no bm; dizziness, palpitations, worsening abdominal pain, worsening vaginal bleeding, or any other concerns.   Vital Signs Last 24 Hrs  T(C): 36.6 (12 Jan 2024 06:30), Max: 36.8 (11 Jan 2024 19:12)  T(F): 97.9 (12 Jan 2024 06:30), Max: 98.3 (11 Jan 2024 19:12)  HR: 84 (12 Jan 2024 06:30) (84 - 92)  BP: 100/60 (12 Jan 2024 06:30) (94/59 - 101/66)  BP(mean): --  RR: 18 (12 Jan 2024 06:30) (17 - 18)  SpO2: 98% (12 Jan 2024 06:30) (98% - 100%)    Parameters below as of 12 Jan 2024 06:30  Patient On (Oxygen Delivery Method): room air        Gen: A&O x 3, NAD  Chest: CTABL  Cardiac: S1+S2+ RRR  Breast: Soft, nontender, nonengorged  Abdomen: +BS; soft; Nontender, nondistended, Incision C/D/I steri strips in place   Gyn: Minimal lochia  Extremities: Nontender, DTRS 2+, no worsening edema                        8.1    9.90  )-----------( 164      ( 12 Jan 2024 06:00 )             25.4       A/P: 28 year old POD #2 s/p c/s, acute on chronic anemia; stable      -oral iron / vitamin c   -Pain management as needed  -cont post op care  -OOB and ambulate  -encourage incentive spirometer use  -Encourage breastfeeding   -discharge home today  -d/w dr. Winters

## 2024-01-12 NOTE — PROGRESS NOTE ADULT - PROBLEM SELECTOR PLAN 2
-oral iron / vitamin c   -Pain management as needed  -cont post op care  -OOB and ambulate  -encourage incentive spirometer use  -Encourage breastfeeding   -discharge home today  -d/w dr. Winters

## 2024-01-26 ENCOUNTER — OUTPATIENT (OUTPATIENT)
Dept: OUTPATIENT SERVICES | Facility: HOSPITAL | Age: 29
LOS: 1 days | End: 2024-01-26
Payer: MEDICAID

## 2024-01-26 ENCOUNTER — APPOINTMENT (OUTPATIENT)
Dept: OBGYN | Facility: CLINIC | Age: 29
End: 2024-01-26
Payer: MEDICAID

## 2024-01-26 VITALS
TEMPERATURE: 97.2 F | HEART RATE: 71 BPM | OXYGEN SATURATION: 99 % | HEIGHT: 62 IN | RESPIRATION RATE: 18 BRPM | DIASTOLIC BLOOD PRESSURE: 70 MMHG | SYSTOLIC BLOOD PRESSURE: 103 MMHG | BODY MASS INDEX: 22.45 KG/M2 | WEIGHT: 122 LBS

## 2024-01-26 DIAGNOSIS — Z34.90 ENCOUNTER FOR SUPERVISION OF NORMAL PREGNANCY, UNSPECIFIED, UNSPECIFIED TRIMESTER: ICD-10-CM

## 2024-01-26 DIAGNOSIS — Z60.3 ACCULTURATION DIFFICULTY: ICD-10-CM

## 2024-01-26 DIAGNOSIS — Z90.49 ACQUIRED ABSENCE OF OTHER SPECIFIED PARTS OF DIGESTIVE TRACT: Chronic | ICD-10-CM

## 2024-01-26 DIAGNOSIS — Z87.898 PERSONAL HISTORY OF OTHER SPECIFIED CONDITIONS: ICD-10-CM

## 2024-01-26 DIAGNOSIS — O34.219 MATERNAL CARE FOR UNSPECIFIED TYPE SCAR FROM PREVIOUS CESAREAN DELIVERY: ICD-10-CM

## 2024-01-26 DIAGNOSIS — Z86.19 PERSONAL HISTORY OF OTHER INFECTIOUS AND PARASITIC DISEASES: ICD-10-CM

## 2024-01-26 DIAGNOSIS — Z75.8 ACCULTURATION DIFFICULTY: ICD-10-CM

## 2024-01-26 DIAGNOSIS — Z98.891 HISTORY OF UTERINE SCAR FROM PREVIOUS SURGERY: ICD-10-CM

## 2024-01-26 DIAGNOSIS — Z34.00 ENCOUNTER FOR SUPERVISION OF NORMAL FIRST PREGNANCY, UNSPECIFIED TRIMESTER: ICD-10-CM

## 2024-01-26 PROCEDURE — G0463: CPT

## 2024-01-26 PROCEDURE — 99213 OFFICE O/P EST LOW 20 MIN: CPT

## 2024-01-26 SDOH — SOCIAL STABILITY - SOCIAL INSECURITY: ACCULTURATION DIFFICULTY: Z60.3

## 2024-01-26 NOTE — HISTORY OF PRESENT ILLNESS
[Postpartum Follow Up] : postpartum follow up [Delivery Date: ___] : on [unfilled] [Repeat C/S] : delivered by  section (repeat) [Male] : Delivery History: baby boy [Wt. ___] : weighing [unfilled] [Breastfeeding] : currently nursing [BreastFeeding Problems] : breastfeeding problems [None] : No associated symptoms are reported [Complications:___] : no complications [Rhogam] : Rhogam was not administered [Rubella Vaccine] : Rubella vaccine was not administered [BTL] : no tubal ligation [Pertussis Vaccine] : Pertussis vaccine was not administered [BF with Difficulty] : nursing without difficulty [Resumed Menses] : has not resumed her menses [Resumed [place]] : has not resumed intercourse [Intended Contraception] : the patient does not intended to use contraception postpartum [Abdominal Pain] : no abdominal pain [Back Pain] : no back pain [Breast Pain] : no breast pain [Cracked Nipples] : no cracked nipples [Chest Pain] : no chest pain [S/Sx PP Depression] : no signs/symptoms of postpartum depression [Episiotomy Site Pain] : no episiotomy site pain [Heavy Bleeding] : no heavy bleeding [Incisional Pain] : no incisional pain [Incisional Drainage] : no incisional drainage [Irregular Bleeding] : no irregular bleeding [Shortness of Breath] : no shortness of breath [Leg Pain] : no leg pain [Suicidal Ideation] : no suicidal ideation [Vaginal Discharge] : no vaginal discharge [Chills] : no chills [Fatigue] : no fatigue [Dysuria] : no dysuria [Headache] : no headache [Fever] : no fever [Nausea] : no nausea [Vomiting] : no vomiting [Clean/Dry/Intact] : clean, dry and intact [Erythema] : not erythematous [Swelling] : not swollen [Doing Well] : is doing well [No Sign of Infection] : is showing no signs of infection [No [place]] : to avoid sexual intercourse [Limited ADLs] : to participate in activities of daily living with limitations [No Work] : not to work [No Housework] : not to do housework [No Sports] : not to participate in sports [FreeTextEntry8] : recently seen and treated for Mastitis, pt currently on Keflex [de-identified] : repeat elective C/S [de-identified] : Incision check, CDI, afebrile, no pain reported, recovering well, in treatment for mastitis, no evidence of further infection [de-identified] : follow up in 4 wks, RAJESH Bacon present throughout visit for [place] translation assistance

## 2024-01-29 DIAGNOSIS — Z98.891 HISTORY OF UTERINE SCAR FROM PREVIOUS SURGERY: ICD-10-CM

## 2024-01-29 DIAGNOSIS — Z60.3 ACCULTURATION DIFFICULTY: ICD-10-CM

## 2024-01-29 SDOH — SOCIAL STABILITY - SOCIAL INSECURITY: ACCULTURATION DIFFICULTY: Z60.3

## 2024-02-23 ENCOUNTER — OUTPATIENT (OUTPATIENT)
Dept: OUTPATIENT SERVICES | Facility: HOSPITAL | Age: 29
LOS: 1 days | End: 2024-02-23
Payer: MEDICAID

## 2024-02-23 ENCOUNTER — APPOINTMENT (OUTPATIENT)
Dept: OBGYN | Facility: CLINIC | Age: 29
End: 2024-02-23
Payer: MEDICAID

## 2024-02-23 VITALS
BODY MASS INDEX: 22.82 KG/M2 | HEIGHT: 62 IN | RESPIRATION RATE: 18 BRPM | WEIGHT: 124 LBS | DIASTOLIC BLOOD PRESSURE: 68 MMHG | OXYGEN SATURATION: 98 % | HEART RATE: 71 BPM | TEMPERATURE: 97.2 F | SYSTOLIC BLOOD PRESSURE: 105 MMHG

## 2024-02-23 DIAGNOSIS — R29.898 OTHER SYMPTOMS AND SIGNS INVOLVING THE MUSCULOSKELETAL SYSTEM: ICD-10-CM

## 2024-02-23 DIAGNOSIS — Z90.49 ACQUIRED ABSENCE OF OTHER SPECIFIED PARTS OF DIGESTIVE TRACT: Chronic | ICD-10-CM

## 2024-02-23 DIAGNOSIS — Z34.00 ENCOUNTER FOR SUPERVISION OF NORMAL FIRST PREGNANCY, UNSPECIFIED TRIMESTER: ICD-10-CM

## 2024-02-23 PROCEDURE — G0463: CPT

## 2024-02-23 PROCEDURE — 99213 OFFICE O/P EST LOW 20 MIN: CPT

## 2024-02-23 NOTE — HISTORY OF PRESENT ILLNESS
[Postpartum Follow Up] : postpartum follow up [Last Pap Date: ___] : Last Pap Date: [unfilled] [Delivery Date: ___] : on [unfilled] [Repeat C/S] : delivered by  section (repeat) [Male] : Delivery History: baby boy [Wt. ___] : weighing [unfilled] [Discharge HCT: ___] : hematocrit level was [unfilled] [Discharge HGB: ___] : hemoglobin level was [unfilled] [BreastFeeding Problems] : breastfeeding problems [Harwood Depression Scale ___ (0-30)] : [unfilled] [Clean/Dry/Intact] : clean, dry and intact [Back to Normal] : is back to normal in size [Normal] : the vagina was normal [Examination Of The Breasts] : breasts are normal [None] : None [Complications:___] : no complications [Rhogam] : Rhogam was not administered [Rubella Vaccine] : Rubella vaccine was not administered [Pertussis Vaccine] : Pertussis vaccine was not administered [BTL] : no tubal ligation [Resumed Menses] : has not resumed her menses [Resumed [place]] : has not resumed intercourse [Intended Contraception] : the patient does not intended to use contraception postpartum [Abdominal Pain] : no abdominal pain [Back Pain] : no back pain [Chest Pain] : no chest pain [Cracked Nipples] : no cracked nipples [S/Sx PP Depression] : no signs/symptoms of postpartum depression [Episiotomy Site Pain] : no episiotomy site pain [Heavy Bleeding] : no heavy bleeding [Incisional Drainage] : no incisional drainage [Incisional Pain] : no incisional pain [Irregular Bleeding] : no irregular bleeding [Leg Pain] : no leg pain [Shortness of Breath] : no shortness of breath [Suicidal Ideation] : no suicidal ideation [Vaginal Discharge] : no vaginal discharge [Chills] : no chills [Fatigue] : no fatigue [Dysuria] : no dysuria [Fever] : no fever [Nausea] : no nausea [Vomiting] : no vomiting [Erythema] : not erythematous [Swelling] : not swollen [Dehiscence] : not dehisced [FreeTextEntry8] : RAJESH Mueller translating, pt states since delivery her right leg is weak [de-identified] : repeat elective C/S [de-identified] : right leg with less strength compared to left [de-identified] : PP exam, anatomy restored, IUD desired, right leg weakness since delivery [de-identified] :  CT obtained, follow up in 1 wk for IUD placement, abstinence or condom until IUD insertion, referral to ortho for right leg weakness

## 2024-02-26 LAB
C TRACH RRNA SPEC QL NAA+PROBE: NOT DETECTED
N GONORRHOEA RRNA SPEC QL NAA+PROBE: NOT DETECTED
SOURCE AMPLIFICATION: NORMAL

## 2024-03-01 ENCOUNTER — OUTPATIENT (OUTPATIENT)
Dept: OUTPATIENT SERVICES | Facility: HOSPITAL | Age: 29
LOS: 1 days | End: 2024-03-01
Payer: MEDICAID

## 2024-03-01 ENCOUNTER — APPOINTMENT (OUTPATIENT)
Dept: OBGYN | Facility: CLINIC | Age: 29
End: 2024-03-01
Payer: MEDICAID

## 2024-03-01 VITALS
OXYGEN SATURATION: 98 % | TEMPERATURE: 98.1 F | SYSTOLIC BLOOD PRESSURE: 103 MMHG | HEIGHT: 62 IN | DIASTOLIC BLOOD PRESSURE: 74 MMHG | RESPIRATION RATE: 18 BRPM | HEART RATE: 97 BPM | BODY MASS INDEX: 23.19 KG/M2 | WEIGHT: 126 LBS

## 2024-03-01 DIAGNOSIS — Z00.00 ENCOUNTER FOR GENERAL ADULT MEDICAL EXAMINATION WITHOUT ABNORMAL FINDINGS: ICD-10-CM

## 2024-03-01 DIAGNOSIS — Z90.49 ACQUIRED ABSENCE OF OTHER SPECIFIED PARTS OF DIGESTIVE TRACT: Chronic | ICD-10-CM

## 2024-03-01 DIAGNOSIS — Z30.430 ENCOUNTER FOR INSERTION OF INTRAUTERINE CONTRACEPTIVE DEVICE: ICD-10-CM

## 2024-03-01 PROCEDURE — 81025 URINE PREGNANCY TEST: CPT

## 2024-03-01 PROCEDURE — 58300 INSERT INTRAUTERINE DEVICE: CPT

## 2024-03-01 NOTE — PLAN
[FreeTextEntry1] : 72 hr post insertion abstinence monitor for post op complications, temp, chills, severe abd pain, fouldsmelling discharge check placement 2 months

## 2024-03-01 NOTE — PROCEDURE
[IUD Placement] : intrauterine device (IUD) placement [Prevention of Pregnancy] : prevention of pregnancy [Risks] : risks [Benefits] : benefits [Alternatives] : alternatives [Patient] : patient [Infection] : infection [Bleeding] : bleeding [Pain] : pain [Expulsion] : expulsion [Failure] : failure [Uterine Perforation] : uterine perforation [Neg Pregnancy Test] : negative pregnancy test [Neg GC/Chlamydia] : negative GC/Chlamydia [History of Unprotected [place]] : no history of unprotected intercourse [Tenaculum] : Tenaculum [No Premedication] : No premedication [Easy Passage] : Easy passage [Sounded to ___ cm] : sounded to [unfilled] ~Ucm [ParaGard IUD] : ParaGard IUD [Tolerated Well] : Patient tolerated the procedure well [No Complications] : No complications [None] : None [de-identified] : [id number] [de-identified] : 7/2029 [LMPDate] : N/A, postpartum [de-identified] : 3/2033

## 2024-03-01 NOTE — PROCEDURE
[IUD Placement] : intrauterine device (IUD) placement [Prevention of Pregnancy] : prevention of pregnancy [Risks] : risks [Benefits] : benefits [Alternatives] : alternatives [Patient] : patient [Infection] : infection [Bleeding] : bleeding [Pain] : pain [Expulsion] : expulsion [Failure] : failure [Uterine Perforation] : uterine perforation [Neg Pregnancy Test] : negative pregnancy test [Neg GC/Chlamydia] : negative GC/Chlamydia [History of Unprotected [place]] : no history of unprotected intercourse [Tenaculum] : Tenaculum [No Premedication] : No premedication [Easy Passage] : Easy passage [Sounded to ___ cm] : sounded to [unfilled] ~Ucm [ParaGard IUD] : ParaGard IUD [No Complications] : No complications [Tolerated Well] : Patient tolerated the procedure well [None] : None [de-identified] : [id number] [de-identified] : 7/2029 [LMPDate] : N/A, postpartum [de-identified] : 3/2033

## 2024-03-05 DIAGNOSIS — Z30.430 ENCOUNTER FOR INSERTION OF INTRAUTERINE CONTRACEPTIVE DEVICE: ICD-10-CM

## 2024-04-01 ENCOUNTER — EMERGENCY (EMERGENCY)
Facility: HOSPITAL | Age: 29
LOS: 1 days | Discharge: HOME | End: 2024-04-01
Attending: EMERGENCY MEDICINE
Payer: MEDICAID

## 2024-04-01 VITALS — RESPIRATION RATE: 18 BRPM | TEMPERATURE: 99 F | OXYGEN SATURATION: 98 % | HEART RATE: 95 BPM

## 2024-04-01 VITALS
TEMPERATURE: 101 F | SYSTOLIC BLOOD PRESSURE: 120 MMHG | DIASTOLIC BLOOD PRESSURE: 79 MMHG | HEART RATE: 100 BPM | WEIGHT: 123.9 LBS | RESPIRATION RATE: 17 BRPM | OXYGEN SATURATION: 99 % | HEIGHT: 63 IN

## 2024-04-01 DIAGNOSIS — Z98.891 HISTORY OF UTERINE SCAR FROM PREVIOUS SURGERY: Chronic | ICD-10-CM

## 2024-04-01 DIAGNOSIS — Z90.49 ACQUIRED ABSENCE OF OTHER SPECIFIED PARTS OF DIGESTIVE TRACT: Chronic | ICD-10-CM

## 2024-04-01 LAB
FLUAV AG NPH QL: SIGNIFICANT CHANGE UP
FLUBV AG NPH QL: SIGNIFICANT CHANGE UP
SARS-COV-2 RNA SPEC QL NAA+PROBE: SIGNIFICANT CHANGE UP

## 2024-04-01 PROCEDURE — 99284 EMERGENCY DEPT VISIT MOD MDM: CPT

## 2024-04-01 PROCEDURE — 99283 EMERGENCY DEPT VISIT LOW MDM: CPT

## 2024-04-01 PROCEDURE — 87637 SARSCOV2&INF A&B&RSV AMP PRB: CPT

## 2024-04-01 RX ORDER — IBUPROFEN 200 MG
400 TABLET ORAL ONCE
Refills: 0 | Status: COMPLETED | OUTPATIENT
Start: 2024-04-01 | End: 2024-04-01

## 2024-04-01 RX ADMIN — Medication 400 MILLIGRAM(S): at 11:51

## 2024-04-01 RX ADMIN — Medication 1 TABLET(S): at 11:51

## 2024-04-01 NOTE — ED PROVIDER NOTE - PATIENT PORTAL LINK FT
You can access the FollowMyHealth Patient Portal offered by [facility] by registering at the following website: [email or web address]. By joining [employer]’s FollowMyHealth portal, you will also be able to view your health information using other applications (apps) compatible with our system.

## 2024-04-01 NOTE — ED ADULT NURSE NOTE - OBJECTIVE STATEMENT
Pt presents to the ED with c/o cough, sore throat, and fever x 3 days. No respiratory distress noted.

## 2024-04-01 NOTE — ED PROVIDER NOTE - NS ED ROS FT
Constitutional: (+) fever (-) chills  HENT: (-) congestion (-) rhinorrhea (+) sore throat  Eyes: (-) pain (-) redness  Respiratory: (-) cough (-) shortness of breath (-) wheezing (-) stridor    Cardiovascular: (-) chest pain (-) palpitations (-) leg swelling  Gastrointestinal: (-) abdominal pain (-) blood in stool (no melena/hematochezia) (-) diarrhea (-) vomiting  Genitourinary: (-) dysuria (-) hematuria  Musculoskeletal: (-) gait problem (-) joint swelling (-) myalgias  Skin: (-) color change (-) rash  Neurological: (-) weakness (-) numbness (-) headaches  Psychiatric/Behavioral: (-) confusion

## 2024-04-01 NOTE — ED PROVIDER NOTE - OBJECTIVE STATEMENT
28 female with hx of no known medical problems.   Pt presenting to the ED reporting 3 days of fever & sore throat  Child with similar symptoms at home.  No recent travel.

## 2024-04-01 NOTE — ED ADULT NURSE NOTE - ED STAT RN HANDOFF DETAILS
Patient discharged home as per MD order. All discharge instructions provided to patient. Patient verbalized understanding and left ambulatory in no distress.

## 2024-04-01 NOTE — ED PROVIDER NOTE - CLINICAL SUMMARY MEDICAL DECISION MAKING FREE TEXT BOX
28 female with hx of no known medical problems.   Pt presenting to the ED reporting 3 days of fever & sore throat  Child with similar symptoms at home.  No recent travel.    Vitals with fever.  Nontoxic appearing, n/v intact.  Airway intact, no respiratory distress, no hypoxia.  + Pharyngeal erythema & exudates.  + Cervical adenopathy.  Symptoms concerning for bacterial pharyngitis.    Analgesia and antibiotics given.  Patient advised regarding need for close outpatient follow up.  Patient stable for further care in outpatient setting. No indication for inpatient admission at this time. Patient advised regarding symptomatic & supportive care and symptoms to prompt ED return. Strict return precautions provided.

## 2024-04-01 NOTE — ED PROVIDER NOTE - NSFOLLOWUPCLINICS_GEN_ALL_ED_FT
[facility]  Internal Medicine  [address] [place] [address]  Phone: [phone]  Fax: [phone]  Follow Up Time: 1-3 Days

## 2024-04-01 NOTE — ED PROVIDER NOTE - NSFOLLOWUPINSTRUCTIONS_ED_ALL_ED_FT
Please follow up with your PMD or Medicine Clinic in 2-3 days.  Return to the ER for worsening or concerning symptoms.  Drink plenty of fluids or an oral rehydration solution like Pedialyte, Gatorade, or Powerade.  Keep your diet simple until symptoms improve. Introduce foods as tolerated such as bread, toast, plain rice, boiled potatoes, boiled chicken, bananas, apple sauce, etc. Avoid dairy, spicy, greasy, or fatty foods. Avoid alcohol or tobacco.  Quarantine at home for 5-10 days after the start of symptoms. Isolate from any family members you live with   Take Acetaminophen (Tylenol) 650mg every 6 hours as needed for pain or fever.  Take Ibuprofen (Advil or Motrin) 600mg every 6 hours as needed for pain or fever with food.  Take Amoxicillin/Clavulanate (Augmentin) twice a day for 10 days.    - - - - - - - - - - - - -  Pharyngitis  Upper body outline including the pharynx.  Pharyngitis is a sore throat (pharynx). This is when there is redness, pain, and swelling in your throat. Most of the time, this condition gets better on its own. In some cases, you may need medicine.    What are the causes?  An infection from a virus.  An infection from bacteria.  Allergies.  What increases the risk?  Being 5–24 years old.  Being in crowded environments. These include:  Daycares.  Schools.  Dormitories.  Living in a place with cold temperatures outside.  Having a weakened disease-fighting (immune) system.  What are the signs or symptoms?  Symptoms may vary depending on the cause. Common symptoms include:  Sore throat.  Tiredness (fatigue).  Low-grade fever.  Stuffy nose.  Cough.  Headache.  Other symptoms may include:  Glands in the neck (lymph nodes) that are swollen.  Skin rashes.  Film on the throat or tonsils. This can be caused by an infection from bacteria.  Vomiting.  Red, itchy eyes.  Loss of appetite.  Joint pain and muscle aches.  Tonsils that are temporarily bigger than usual (enlarged).  How is this treated?  Many times, treatment is not needed. This condition usually gets better in 3–4 days without treatment.    If the infection is caused by a bacteria, you may be need to take antibiotics.    Follow these instructions at home:  Medicines    Take over-the-counter and prescription medicines only as told by your doctor.  If you were prescribed an antibiotic medicine, take it as told by your doctor. Do not stop taking the antibiotic even if you start to feel better.  Use throat lozenges or sprays to soothe your throat as told by your doctor.  Children can get pharyngitis. Do not give your child aspirin.  Managing pain    A cup of hot tea.  To help with pain, try:  Sipping warm liquids, such as:  Broth.  Herbal tea.  Warm water.  Eating or drinking cold or frozen liquids, such as frozen ice pops.  Rinsing your mouth (gargle) with a salt water mixture 3–4 times a day or as needed.  To make salt water, dissolve ½–1 tsp (3–6 g) of salt in 1 cup (237 mL) of warm water.  Do not swallow this mixture.  Sucking on hard candy or throat lozenges.  Putting a cool-mist humidifier in your bedroom at night to moisten the air.  Sitting in the bathroom with the door closed for 5–10 minutes while you run hot water in the shower.  General instructions    A do not smoke cigarettes sign.  Do not smoke or use any products that contain nicotine or tobacco. If you need help quitting, ask your doctor.  Rest as told by your doctor.  Drink enough fluid to keep your pee (urine) pale yellow.  How is this prevented?  Wash your hands often for at least 20 seconds with soap and water. If soap and water are not available, use hand .  Do not touch your eyes, nose, or mouth with unwashed hands. Wash hands after touching these areas.  Do not share cups or eating utensils.  Avoid close contact with people who are sick.  Contact a doctor if:  You have large, tender lumps in your neck.  You have a rash.  You cough up green, yellow-brown, or bloody spit.  Get help right away if:  You have a stiff neck.  You drool or cannot swallow liquids.  You cannot drink or take medicines without vomiting.  You have very bad pain that does not go away with medicine.  You have problems breathing, and it is not from a stuffy nose.  You have new pain and swelling in your knees, ankles, wrists, or elbows.  These symptoms may be an emergency. Get help right away. Call your local emergency services (911 in the [place]  Do not wait to see if the symptoms will go away.  Do not drive yourself to the hospital.  Summary  Pharyngitis is a sore throat (pharynx). This is when there is redness, pain, and swelling in your throat.  Most of the time, pharyngitis gets better on its own. Sometimes, you may need medicine.  If you were prescribed an antibiotic medicine, take it as told by your doctor. Do not stop taking the antibiotic even if you start to feel better.  This information is not intended to replace advice given to you by your health care provider. Make sure you discuss any questions you have with your health care provider.

## 2024-04-01 NOTE — ED ADULT NURSE NOTE - IS THE PATIENT ABLE TO BE SCREENED?
Scheduled date of EGD/colonoscopy (as of today): 6/26/23  Physician performing EGD/colonoscopy: Dr Ann Marie Galloway  Location of EGD/colonoscopy[facility]  Desired bowel prep reviewed with patient: Golytely  Instructions reviewed with patient by: Grant Rice  Clearances: benny
Yes

## 2024-04-01 NOTE — ED PROVIDER NOTE - PHYSICAL EXAMINATION
Gen:  Awake, alert, NAD, WDWN, NCAT, non-toxic appearing.   Eyes:  PERRL, EOMI, no icterus, normal lids/lashes, normal conjunctivae.  ENT:  External inspection normal, pink/moist membranes. +Pharyngeal erythema/exudate, no drooling, no lip/tongue/palate/posterior pharynx edema, midline uvula, no oropharyngeal ulcerations/lesions.  CV:  S1S2, regular rate and rhythm, no murmur/gallops/rubs, no JVD, 2+ pulses b/l, no edema/cords/homans, warm/well-perfused.  Resp:  Normal respiratory rate/effort, no respiratory distress, normal voice, speaking full sentences, lungs clear to auscultation b/l, no wheezing/rales/rhonchi, no retractions, no stridor.  Abd:  Soft abdomen, no tender/distended/guarding/rebound, no pulsatile mass, no CVA tender.   Musculoskeletal:  N/V intact, FROM all 4 extremities, normal motor tone, stable gait.   Neck:  FROM neck, supple, trachea midline, no meningismus. +Cervical LAD  Skin:  Color normal for race, warm and dry, no rash.  Neuro:  Oriented x3, CN 2-12 intact (grossly), normal motor (grossly), normal sensory (grossly), normal gait. GCS 15  Psych:  Attention normal. Affect normal. Behavior normal. Judgment normal.

## 2024-05-07 ENCOUNTER — APPOINTMENT (OUTPATIENT)
Dept: OBGYN | Facility: CLINIC | Age: 29
End: 2024-05-07

## 2024-08-21 ENCOUNTER — NON-APPOINTMENT (OUTPATIENT)
Age: 29
End: 2024-08-21

## 2024-08-21 PROBLEM — Z78.9 OTHER SPECIFIED HEALTH STATUS: Chronic | Status: ACTIVE | Noted: 2024-04-01

## 2024-08-22 ENCOUNTER — APPOINTMENT (OUTPATIENT)
Dept: OBGYN | Facility: CLINIC | Age: 29
End: 2024-08-22
Payer: MEDICAID

## 2024-08-22 VITALS
HEART RATE: 82 BPM | DIASTOLIC BLOOD PRESSURE: 74 MMHG | OXYGEN SATURATION: 99 % | WEIGHT: 128.13 LBS | RESPIRATION RATE: 18 BRPM | HEIGHT: 62 IN | SYSTOLIC BLOOD PRESSURE: 107 MMHG | BODY MASS INDEX: 23.58 KG/M2 | TEMPERATURE: 97 F

## 2024-08-22 DIAGNOSIS — Z30.431 ENCOUNTER FOR ROUTINE CHECKING OF INTRAUTERINE CONTRACEPTIVE DEVICE: ICD-10-CM

## 2024-08-22 PROCEDURE — 99213 OFFICE O/P EST LOW 20 MIN: CPT

## 2024-08-22 NOTE — HISTORY OF PRESENT ILLNESS
[TextBox_4] : IUD string check  Int ID [id number] reports some pelvic pain since insertion, worse with menses, wants to continue with IUD for now [PapSmeardate] : 08/03/23 [TextBox_31] : wnl

## 2024-08-23 LAB
BV BACTERIA RRNA VAG QL NAA+PROBE: NOT DETECTED
C GLABRATA RNA VAG QL NAA+PROBE: NOT DETECTED
C TRACH RRNA SPEC QL NAA+PROBE: NOT DETECTED
CANDIDA RRNA VAG QL PROBE: NOT DETECTED
N GONORRHOEA RRNA SPEC QL NAA+PROBE: NOT DETECTED
T VAGINALIS RRNA SPEC QL NAA+PROBE: NOT DETECTED

## 2025-05-27 ENCOUNTER — EMERGENCY (EMERGENCY)
Facility: HOSPITAL | Age: 30
LOS: 1 days | End: 2025-05-27
Attending: STUDENT IN AN ORGANIZED HEALTH CARE EDUCATION/TRAINING PROGRAM
Payer: MEDICAID

## 2025-05-27 VITALS
WEIGHT: 126.99 LBS | DIASTOLIC BLOOD PRESSURE: 84 MMHG | OXYGEN SATURATION: 99 % | RESPIRATION RATE: 16 BRPM | SYSTOLIC BLOOD PRESSURE: 120 MMHG | HEART RATE: 80 BPM | HEIGHT: 62.2 IN | TEMPERATURE: 98 F

## 2025-05-27 DIAGNOSIS — Z98.891 HISTORY OF UTERINE SCAR FROM PREVIOUS SURGERY: Chronic | ICD-10-CM

## 2025-05-27 DIAGNOSIS — Z90.49 ACQUIRED ABSENCE OF OTHER SPECIFIED PARTS OF DIGESTIVE TRACT: Chronic | ICD-10-CM

## 2025-05-27 PROCEDURE — 70490 CT SOFT TISSUE NECK W/O DYE: CPT | Mod: 26

## 2025-05-27 PROCEDURE — 99285 EMERGENCY DEPT VISIT HI MDM: CPT

## 2025-05-27 RX ORDER — DIPHENHYDRAMINE HCL 12.5MG/5ML
50 ELIXIR ORAL ONCE
Refills: 0 | Status: COMPLETED | OUTPATIENT
Start: 2025-05-27 | End: 2025-05-27

## 2025-05-27 RX ORDER — KETOROLAC TROMETHAMINE 30 MG/ML
15 INJECTION, SOLUTION INTRAMUSCULAR; INTRAVENOUS ONCE
Refills: 0 | Status: DISCONTINUED | OUTPATIENT
Start: 2025-05-27 | End: 2025-05-27

## 2025-05-27 RX ORDER — DEXAMETHASONE 0.5 MG/1
10 TABLET ORAL ONCE
Refills: 0 | Status: COMPLETED | OUTPATIENT
Start: 2025-05-27 | End: 2025-05-27

## 2025-05-27 NOTE — ED PROVIDER NOTE - NSFOLLOWUPINSTRUCTIONS_ED_ALL_ED_FT
– Return for any worsening or concerning symptoms (see below).  – Follow up with primary care doctor in the next 5 to 7 days.   – For persistent throat pain, you can follow-up with an ENT doctor in the next 2 to 3 days.  Find contact formation below, call to make an appointment.    – Vozvrashchaytes' leila manjinder kassandra naomipokojamalkh [facility] (sm. nizhe). – Obratites' k lechashchemu vrachu v techeniye sleduyushchikh 5–7 dney.  – Leila postoyannoy boli v gorle vy mozhete obratit'sya k JUSTIN-vrachu v techeniye sleduyushchikh 2–3 dney. Jean-Claude informatsiyu sm. nizhe, pozvonite, chtoby zapisat'sya na priyem.      Medications:    [Medication Name (if prescribed), e.g., Amoxicillin]: Take as directed. Finish the entire course of antibiotics even if you feel better.  Pain relief: Over-the-counter pain relievers such as acetaminophen (Tylenol) or ibuprofen (Advil, Motrin) can help reduce pain and fever. Follow the instructions on the label and do not exceed the recommended dose.  Throat lozenges/sprays: These can provide temporary relief from throat discomfort.  Home Care:    Rest: Get plenty of rest to help your body fight infection.  Hydration: Drink plenty of fluids, such as water, clear broths, and herbal tea. Avoid acidic or sugary drinks which may irritate your throat.  Gargle with salt water: Dissolve 1/2 teaspoon of salt in 8 ounces of warm water and gargle several times a day. This can help soothe throat pain and reduce inflammation.  Humidify the air: A cool-mist humidifier or vaporizer can add moisture to the air and soothe a dry, irritated throat.  Avoid irritants: Stay away from smoke, dust, and chemical fumes, which can worsen throat irritation.  Diet:    Choose soft, easy-to-swallow foods such as soups, yogurt, mashed potatoes, and scrambled eggs.  Avoid spicy, acidic, or crunchy foods that may irritate your throat.  Follow-up:    [If applicable] Schedule a follow-up appointment with your doctor on [Date] at [Time].  [If strep throat] You can return to work/school 24 hours after starting antibiotics, provided you are feeling better and no longer have a fever.  When to seek medical attention:    Difficulty breathing or swallowing  Severe throat pain that doesn't improve with treatment  High fever (over 101°F or 38.3°C) that doesn't respond to medication  Swollen glands in your neck  Signs of dehydration (e.g., decreased urination, dizziness, dry mouth)  Ear pain or drainage  Rash  Symptoms worsen or persist for more than a week    Lekarstva:  [Nasaigevanfanny lekarstva (yesli naznacheno), betsy Amoksitsillin]: Prinimayte po naznacheniyu. Zakonchite ves' kurs antibiotiarik, dazhe yesli pochuvstvuyete sebya luchshe. Obezbolivaniye: Bezretsepturnyye obezbolivanikolayye, takinolberto kak atsetaminofen (Tylenol) kassandra ibuprofen (Advil, Motrin), mogut pomoch' umen'shit' bessie' i likhoradku. Sleduyte instruktsiyam na etiketke i ne prevyshayte rekomenduyemuyu dozu. Ledentsy/sprei dlya gorla: Sharad mogut obespechit' vremennoye oblegcheniye diskomforta v gorle. aJret ukhod:  Otdykh: Bessie'she otdykhayte, chtoby pomoch' organizmu borot'sya s infektsiyey. Regidratatsiya: Peyte mnogo monique, bernard meyer, prosaigerachselam bul'onov i travyanogo madalyn. Izbegayte kislykh kassandra sharon juliotorynolberto mogut razdrazhedyt' gorlo. Poloskaniye gorla soleraheel vodoy: Rastvorite 1/2 trentonynivan abdul marco a v 8 untsiyakh teploy vody i poloshchite gorlo neskol'ko costa v den'. Eto mozhet pomoch' uspokoit' bessie' v gorle i umen'shit' vospaleniye. Uvlazhnyayte vozdukh: uvlazhnitel' s kholodnym parom kassandra isparitel' mozhet uvlazhnyat' vozdukh i uspokaivat' sukhoye, razdrazhennoye gorlo. Izbegayte razdrazhiteley: derzhites' podal'she ot dyma, pyli i khimicheskikh parov, kotoryye mogut usilit' razdrazheniye gorla. Diyeta:  Vybirayte myagkuyu, legko glotayemuyu pishchu, takuyu ??kak supy, yogurt, kartofel'noye pyure i yaichnitsa-boltun'ya. Izbegayte ostroy, kisloy kassandra khrustyashchey pishchi, kotoraya mozhet razdrazhat' gorlo. Posleduyushcheye nablyudeniye:  [Yesli primenimo] Zapishites' na povtornyy priyem k vrachu na [Data] v [Vremya].  [Yesli u vas streptokokkovaya angina] Vy mozhete vernut'sya na rabotu/v shkolu cherez 24 chasa posle nachala priyema antibiotikov, yesli vy chuvstvuyete sebya luchshe i u vas bessie'she net temperatury. Kogda sleduyet obratit'sya za meditsinskoy pomoshch'loja:  Zatrudnennoye dykhaniye kassandra glotaniye Leila'lulu bessie' v gorle, kotoraya ne prokhodit posle lecheniya Vysokaya temperatura (boleye 101°F kassandra 38,3°C), kotoraya ne poddayetsya lecheniyu Opukhshiye zhelezy na sheye Priznaki obezvozhivaniya (dante meyer golovokruzheniye, sukhost' vo rtu) Bessie' v vincent kassandra katelyn barrientos kassandra césar hartman

## 2025-05-27 NOTE — ED PROVIDER NOTE - OBJECTIVE STATEMENT
30 yo Female, no past medical history, presenting with throat discomfort as well as concern for allergic reaction.  Maldivian  used, ID #[id number].  Patient states that last night, she felt an itching sensation to the bilateral palms after using a cleaning product.  Patient states that she washed  of her palms, applied an allergy ointment and felt better.  Patient states that later on the day around 11 AM, she excellently tripped on a piece of bread and has been having generalized chest pain, as well as throat discomfort with radiation to the back that started at 12 PM.  Patient went to the urgent care after developing some pruritic rash to her trunk, face, neck and extremities.  She was prescribed an ointment and given cetirizine pill, patient states that she felt better afterwards.  Patient states that the rash has improved, as well as the pruritus.  She endorses pain with swallowing, however is denying any tongue swelling, or lip swelling.  She endorses generalized chest pain associate with some shortness of breath.  She denies any history of blood clots, OCP use, recent travel or surgeries, leg pain or leg swelling, or hemoptysis.  No nausea/vomiting.  No fever no chills.

## 2025-05-27 NOTE — ED PROVIDER NOTE - CLINICAL SUMMARY MEDICAL DECISION MAKING FREE TEXT BOX
30 yo Female, no past medical history, presenting with throat discomfort as well as concern for allergic reaction.   VSS, afebrile.  Resolving minor  urticarial rash to the trunk and extremities.   No lip or tongue swelling appreciated. No abdominal pain,  nausea, vomiting, or diarrhea.  No wheezing, patient maintaining airway, no stridor or drooling, patient appears comfortable with normal respiratory effort, low suspicion for anaphylaxis.   patient endorsing shortness of breath localized to her throat, possibly secondary to irritation from eating a piece of bread earlier  versus retained foreign body, obtain CT of the neck to evaluate.  Will provide allergy symptomatic relief with Benadryl, Decadron.  Will also give IV Toradol for throat discomfort.  Will obtain cardiac workup.  PERC negative, low suspicion for PE, no need for D-dimer or CTA at this time.  Will obtain chest x-ray. 30 yo Female, no past medical history, presenting with throat discomfort as well as concern for allergic reaction.   VSS, afebrile.  Resolving minor  urticarial rash to the trunk and extremities.   No lip or tongue swelling appreciated. No abdominal pain,  nausea, vomiting, or diarrhea.  No wheezing, patient maintaining airway, no stridor or drooling, patient appears comfortable with normal respiratory effort, low suspicion for anaphylaxis, suspect contact deramtitis from cleaning product use..   patient endorsing shortness of breath localized to her throat, possibly secondary to irritation from eating a piece of bread earlier  versus retained foreign body, obtain CT of the neck to evaluate.  Will provide allergy symptomatic relief with Benadryl, Decadron.  Will also give IV Toradol for throat discomfort.  Will obtain cardiac workup.  PERC negative, low suspicion for PE, no need for D-dimer or CTA at this time.  Will obtain chest x-ray.

## 2025-05-27 NOTE — ED PROVIDER NOTE - PHYSICAL EXAMINATION
Gen: NAD; well appearing  ENT: mucous membranes moist, no discharge, no stridor/drooling  Neck: neck supple  Resp: CTAB, no W/R/R  CV: RRR, +S1/S2, no M/R/G  GI: Abdomen soft non-distended, NTTP, no masses  Skin: resolving urticaria to face/neck/trunk

## 2025-05-27 NOTE — ED PROVIDER NOTE - PROGRESS NOTE DETAILS
Reassessed at bedside, patient feeling improved.  CT showing no foreign bodies  in the neck.  Chest x-ray clear lungs.  EKG NSR, labs within normal limits.  Strict return precaution discussed with patient verbalized understanding.  Will discharge.   Northern Irish  used, ID #[id number].

## 2025-05-27 NOTE — ED ADULT TRIAGE NOTE - CHIEF COMPLAINT QUOTE
Pt reports "I have chest pain and shortness of breath that has gotten worse since 1pm this afternoon. It all started last night with an allergy to my palm. It resolved then this morning, I choked on a piece of bread that was hard. Since then I have felt a knot in my throat. After breakfast I had another allergic reaction, I broke out in a rash all over. I went to an urgent care they ordered a pill and hydrocortisone cream that helped the itchiness. I am still having itchiness/redness to my face. My chest pain and shortness of breath have not gone away." Pt denies any dizziness, reports  self inducing vomiting for nausea. Denies any further complaints. Lung sounds clear and equal bilaterally, no stridor.

## 2025-05-27 NOTE — ED PROVIDER NOTE - PATIENT PORTAL LINK FT
You can access the FollowMyHealth Patient Portal offered by [facility] by registering at the following website: [email or web address]. By joining [employer]’s FollowMyHealth portal, you will also be able to view your health information using other applications (apps) compatible with our system.

## 2025-05-27 NOTE — ED PROVIDER NOTE - NSFOLLOWUPCLINICS_GEN_ALL_ED_FT
[facility]  Otolaryngology (ENT)  [address], [address]  [place] [address]  Phone: [phone]  Fax:     [facility] - ENT  Otolaryngology (ENT)  [address] [place] [address]  Phone: [phone]  Fax:     [facility]  Otolaryngology (ENT)  [address], [facility] - [address]th [place] [address]  Phone: [phone]  Fax:

## 2025-05-27 NOTE — ED PROVIDER NOTE - WR ORDER DATE AND TIME 1
Well appearing, awake, alert, oriented to person, place, time/situation and in no apparent distress. normal... 28-May-2025 01:48

## 2025-05-28 VITALS
HEART RATE: 86 BPM | RESPIRATION RATE: 17 BRPM | DIASTOLIC BLOOD PRESSURE: 70 MMHG | TEMPERATURE: 98 F | OXYGEN SATURATION: 100 % | SYSTOLIC BLOOD PRESSURE: 110 MMHG

## 2025-05-28 LAB
ALBUMIN SERPL ELPH-MCNC: 4.2 G/DL — SIGNIFICANT CHANGE UP (ref 3.5–5)
ALP SERPL-CCNC: 47 U/L — SIGNIFICANT CHANGE UP (ref 40–120)
ALT FLD-CCNC: 20 U/L DA — SIGNIFICANT CHANGE UP (ref 10–60)
ANION GAP SERPL CALC-SCNC: 2 MMOL/L — LOW (ref 5–17)
AST SERPL-CCNC: 11 U/L — SIGNIFICANT CHANGE UP (ref 10–40)
BASOPHILS # BLD AUTO: 0.04 K/UL — SIGNIFICANT CHANGE UP (ref 0–0.2)
BASOPHILS NFR BLD AUTO: 0.4 % — SIGNIFICANT CHANGE UP (ref 0–2)
BILIRUB SERPL-MCNC: 0.4 MG/DL — SIGNIFICANT CHANGE UP (ref 0.2–1.2)
BUN SERPL-MCNC: 10 MG/DL — SIGNIFICANT CHANGE UP (ref 7–18)
CALCIUM SERPL-MCNC: 9.6 MG/DL — SIGNIFICANT CHANGE UP (ref 8.4–10.5)
CHLORIDE SERPL-SCNC: 108 MMOL/L — SIGNIFICANT CHANGE UP (ref 96–108)
CO2 SERPL-SCNC: 24 MMOL/L — SIGNIFICANT CHANGE UP (ref 22–31)
CREAT SERPL-MCNC: 0.68 MG/DL — SIGNIFICANT CHANGE UP (ref 0.5–1.3)
EGFR: 121 ML/MIN/1.73M2 — SIGNIFICANT CHANGE UP
EGFR: 121 ML/MIN/1.73M2 — SIGNIFICANT CHANGE UP
EOSINOPHIL # BLD AUTO: 0.31 K/UL — SIGNIFICANT CHANGE UP (ref 0–0.5)
EOSINOPHIL NFR BLD AUTO: 2.8 % — SIGNIFICANT CHANGE UP (ref 0–6)
GLUCOSE SERPL-MCNC: 102 MG/DL — HIGH (ref 70–99)
HCG SERPL-ACNC: <1 MIU/ML — SIGNIFICANT CHANGE UP
HCT VFR BLD CALC: 36.5 % — SIGNIFICANT CHANGE UP (ref 34.5–45)
HGB BLD-MCNC: 12.2 G/DL — SIGNIFICANT CHANGE UP (ref 11.5–15.5)
IMM GRANULOCYTES NFR BLD AUTO: 0.3 % — SIGNIFICANT CHANGE UP (ref 0–0.9)
LIDOCAIN IGE QN: 40 U/L — SIGNIFICANT CHANGE UP (ref 13–75)
LYMPHOCYTES # BLD AUTO: 2.22 K/UL — SIGNIFICANT CHANGE UP (ref 1–3.3)
LYMPHOCYTES # BLD AUTO: 20 % — SIGNIFICANT CHANGE UP (ref 13–44)
MCHC RBC-ENTMCNC: 27.4 PG — SIGNIFICANT CHANGE UP (ref 27–34)
MCHC RBC-ENTMCNC: 33.4 G/DL — SIGNIFICANT CHANGE UP (ref 32–36)
MCV RBC AUTO: 82 FL — SIGNIFICANT CHANGE UP (ref 80–100)
MONOCYTES # BLD AUTO: 0.54 K/UL — SIGNIFICANT CHANGE UP (ref 0–0.9)
MONOCYTES NFR BLD AUTO: 4.9 % — SIGNIFICANT CHANGE UP (ref 2–14)
NEUTROPHILS # BLD AUTO: 7.96 K/UL — HIGH (ref 1.8–7.4)
NEUTROPHILS NFR BLD AUTO: 71.6 % — SIGNIFICANT CHANGE UP (ref 43–77)
NRBC BLD AUTO-RTO: 0 /100 WBCS — SIGNIFICANT CHANGE UP (ref 0–0)
NT-PROBNP SERPL-SCNC: 21 PG/ML — SIGNIFICANT CHANGE UP (ref 0–125)
PLATELET # BLD AUTO: 204 K/UL — SIGNIFICANT CHANGE UP (ref 150–400)
POTASSIUM SERPL-MCNC: 4.1 MMOL/L — SIGNIFICANT CHANGE UP (ref 3.5–5.3)
POTASSIUM SERPL-SCNC: 4.1 MMOL/L — SIGNIFICANT CHANGE UP (ref 3.5–5.3)
PROT SERPL-MCNC: 7.9 G/DL — SIGNIFICANT CHANGE UP (ref 6–8.3)
RBC # BLD: 4.45 M/UL — SIGNIFICANT CHANGE UP (ref 3.8–5.2)
RBC # FLD: 13.3 % — SIGNIFICANT CHANGE UP (ref 10.3–14.5)
SODIUM SERPL-SCNC: 134 MMOL/L — LOW (ref 135–145)
TROPONIN I, HIGH SENSITIVITY RESULT: 3.5 NG/L — SIGNIFICANT CHANGE UP
WBC # BLD: 11.1 K/UL — HIGH (ref 3.8–10.5)
WBC # FLD AUTO: 11.1 K/UL — HIGH (ref 3.8–10.5)

## 2025-05-28 PROCEDURE — 85025 COMPLETE CBC W/AUTO DIFF WBC: CPT

## 2025-05-28 PROCEDURE — 83690 ASSAY OF LIPASE: CPT

## 2025-05-28 PROCEDURE — 96375 TX/PRO/DX INJ NEW DRUG ADDON: CPT

## 2025-05-28 PROCEDURE — 93005 ELECTROCARDIOGRAM TRACING: CPT

## 2025-05-28 PROCEDURE — 99284 EMERGENCY DEPT VISIT MOD MDM: CPT | Mod: 25

## 2025-05-28 PROCEDURE — 80053 COMPREHEN METABOLIC PANEL: CPT

## 2025-05-28 PROCEDURE — 84702 CHORIONIC GONADOTROPIN TEST: CPT

## 2025-05-28 PROCEDURE — 84484 ASSAY OF TROPONIN QUANT: CPT

## 2025-05-28 PROCEDURE — 71046 X-RAY EXAM CHEST 2 VIEWS: CPT

## 2025-05-28 PROCEDURE — 71046 X-RAY EXAM CHEST 2 VIEWS: CPT | Mod: 26

## 2025-05-28 PROCEDURE — 36415 COLL VENOUS BLD VENIPUNCTURE: CPT

## 2025-05-28 PROCEDURE — 83880 ASSAY OF NATRIURETIC PEPTIDE: CPT

## 2025-05-28 PROCEDURE — 96374 THER/PROPH/DIAG INJ IV PUSH: CPT

## 2025-05-28 PROCEDURE — 70490 CT SOFT TISSUE NECK W/O DYE: CPT

## 2025-05-28 RX ADMIN — DEXAMETHASONE 102 MILLIGRAM(S): 0.5 TABLET ORAL at 00:59

## 2025-05-28 RX ADMIN — Medication 50 MILLIGRAM(S): at 01:00

## 2025-05-28 RX ADMIN — KETOROLAC TROMETHAMINE 15 MILLIGRAM(S): 30 INJECTION, SOLUTION INTRAMUSCULAR; INTRAVENOUS at 00:59

## 2025-05-28 NOTE — ED ADULT NURSE NOTE - NSFALLHARMRISKINTERV_ED_ALL_ED
